# Patient Record
Sex: FEMALE | Race: WHITE | NOT HISPANIC OR LATINO | Employment: OTHER | URBAN - NONMETROPOLITAN AREA
[De-identification: names, ages, dates, MRNs, and addresses within clinical notes are randomized per-mention and may not be internally consistent; named-entity substitution may affect disease eponyms.]

---

## 2018-01-19 ENCOUNTER — OFFICE VISIT CONVERTED (OUTPATIENT)
Dept: FAMILY MEDICINE CLINIC | Age: 58
End: 2018-01-19
Attending: NURSE PRACTITIONER

## 2018-03-27 ENCOUNTER — OFFICE VISIT CONVERTED (OUTPATIENT)
Dept: FAMILY MEDICINE CLINIC | Age: 58
End: 2018-03-27
Attending: NURSE PRACTITIONER

## 2018-09-11 ENCOUNTER — OFFICE VISIT CONVERTED (OUTPATIENT)
Dept: FAMILY MEDICINE CLINIC | Age: 58
End: 2018-09-11
Attending: NURSE PRACTITIONER

## 2019-04-03 ENCOUNTER — OFFICE VISIT CONVERTED (OUTPATIENT)
Dept: FAMILY MEDICINE CLINIC | Age: 59
End: 2019-04-03
Attending: NURSE PRACTITIONER

## 2019-06-24 ENCOUNTER — HOSPITAL ENCOUNTER (OUTPATIENT)
Dept: OTHER | Facility: HOSPITAL | Age: 59
Discharge: HOME | End: 2019-06-24
Attending: INTERNAL MEDICINE

## 2019-06-24 LAB
ALBUMIN SERPL-MCNC: 4 G/DL (ref 3.5–5)
ALBUMIN/GLOB SERPL: 1.1 {RATIO} (ref 1.4–2.6)
ALP SERPL-CCNC: 132 U/L (ref 53–141)
ALT SERPL-CCNC: 12 U/L (ref 10–40)
ANION GAP SERPL CALC-SCNC: 16 MMOL/L (ref 8–19)
AST SERPL-CCNC: 15 U/L (ref 15–50)
BASOPHILS # BLD AUTO: 0.07 10*3/UL (ref 0–0.2)
BASOPHILS NFR BLD AUTO: 0.7 % (ref 0–3)
BILIRUB SERPL-MCNC: <0.15 MG/DL (ref 0.2–1.3)
BUN SERPL-MCNC: 11 MG/DL (ref 5–25)
BUN/CREAT SERPL: 14 {RATIO} (ref 6–20)
CALCIUM SERPL-MCNC: 9.3 MG/DL (ref 8.7–10.4)
CHLORIDE SERPL-SCNC: 97 MMOL/L (ref 99–111)
CONV ABS IMM GRAN: 0.01 10*3/UL (ref 0–0.2)
CONV CO2: 26 MMOL/L (ref 22–32)
CONV IMMATURE GRAN: 0.1 % (ref 0–1.8)
CONV TOTAL PROTEIN: 7.5 G/DL (ref 6.3–8.2)
CREAT UR-MCNC: 0.81 MG/DL (ref 0.5–0.9)
DEPRECATED RDW RBC AUTO: 51.6 FL (ref 36.4–46.3)
EOSINOPHIL # BLD AUTO: 0.16 10*3/UL (ref 0–0.7)
EOSINOPHIL # BLD AUTO: 1.6 % (ref 0–7)
ERYTHROCYTE [DISTWIDTH] IN BLOOD BY AUTOMATED COUNT: 15.9 % (ref 11.7–14.4)
GFR SERPLBLD BASED ON 1.73 SQ M-ARVRAT: >60 ML/MIN/{1.73_M2}
GLOBULIN UR ELPH-MCNC: 3.5 G/DL (ref 2–3.5)
GLUCOSE SERPL-MCNC: 87 MG/DL (ref 65–99)
HBA1C MFR BLD: 12.4 G/DL (ref 12–16)
HCT VFR BLD AUTO: 38.6 % (ref 37–47)
LYMPHOCYTES # BLD AUTO: 4.04 10*3/UL (ref 1–5)
MCH RBC QN AUTO: 28.5 PG (ref 27–31)
MCHC RBC AUTO-ENTMCNC: 32.1 G/DL (ref 33–37)
MCV RBC AUTO: 88.7 FL (ref 81–99)
MONOCYTES # BLD AUTO: 0.81 10*3/UL (ref 0.2–1.2)
MONOCYTES NFR BLD AUTO: 7.9 % (ref 3–10)
NEUTROPHILS # BLD AUTO: 5.13 10*3/UL (ref 2–8)
NEUTROPHILS NFR BLD AUTO: 50.2 % (ref 30–85)
NRBC CBCN: 0 % (ref 0–0.7)
OSMOLALITY SERPL CALC.SUM OF ELEC: 279 MOSM/KG (ref 273–304)
PLATELET # BLD AUTO: 361 10*3/UL (ref 130–400)
PMV BLD AUTO: 10.4 FL (ref 9.4–12.3)
POTASSIUM SERPL-SCNC: 3.7 MMOL/L (ref 3.5–5.3)
RBC # BLD AUTO: 4.35 10*6/UL (ref 4.2–5.4)
SODIUM SERPL-SCNC: 135 MMOL/L (ref 135–147)
T4 FREE SERPL-MCNC: 1.1 NG/DL (ref 0.9–1.8)
TSH SERPL-ACNC: 0.01 M[IU]/L (ref 0.27–4.2)
VARIANT LYMPHS NFR BLD MANUAL: 39.5 % (ref 20–45)
WBC # BLD AUTO: 10.22 10*3/UL (ref 4.8–10.8)

## 2019-07-25 ENCOUNTER — OFFICE VISIT CONVERTED (OUTPATIENT)
Dept: FAMILY MEDICINE CLINIC | Age: 59
End: 2019-07-25
Attending: NURSE PRACTITIONER

## 2019-11-05 ENCOUNTER — OFFICE VISIT CONVERTED (OUTPATIENT)
Dept: FAMILY MEDICINE CLINIC | Age: 59
End: 2019-11-05
Attending: NURSE PRACTITIONER

## 2019-11-05 ENCOUNTER — HOSPITAL ENCOUNTER (OUTPATIENT)
Dept: OTHER | Facility: HOSPITAL | Age: 59
Discharge: HOME | End: 2019-11-05
Attending: NURSE PRACTITIONER

## 2019-11-05 LAB
ALBUMIN SERPL-MCNC: 4.4 G/DL (ref 3.5–5)
ALBUMIN/GLOB SERPL: 1.3 {RATIO} (ref 1.4–2.6)
ALP SERPL-CCNC: 113 U/L (ref 53–141)
ALT SERPL-CCNC: 11 U/L (ref 10–40)
ANION GAP SERPL CALC-SCNC: 19 MMOL/L (ref 8–19)
AST SERPL-CCNC: 16 U/L (ref 15–50)
BILIRUB SERPL-MCNC: <0.15 MG/DL (ref 0.2–1.3)
BUN SERPL-MCNC: 12 MG/DL (ref 5–25)
BUN/CREAT SERPL: 15 {RATIO} (ref 6–20)
CALCIUM SERPL-MCNC: 10 MG/DL (ref 8.7–10.4)
CHLORIDE SERPL-SCNC: 97 MMOL/L (ref 99–111)
CONV CO2: 25 MMOL/L (ref 22–32)
CONV TOTAL PROTEIN: 7.8 G/DL (ref 6.3–8.2)
CREAT UR-MCNC: 0.8 MG/DL (ref 0.5–0.9)
GFR SERPLBLD BASED ON 1.73 SQ M-ARVRAT: >60 ML/MIN/{1.73_M2}
GLOBULIN UR ELPH-MCNC: 3.4 G/DL (ref 2–3.5)
GLUCOSE SERPL-MCNC: 80 MG/DL (ref 65–99)
OSMOLALITY SERPL CALC.SUM OF ELEC: 283 MOSM/KG (ref 273–304)
POTASSIUM SERPL-SCNC: 3.5 MMOL/L (ref 3.5–5.3)
SODIUM SERPL-SCNC: 137 MMOL/L (ref 135–147)

## 2019-12-27 ENCOUNTER — HOSPITAL ENCOUNTER (OUTPATIENT)
Dept: OTHER | Facility: HOSPITAL | Age: 59
Discharge: HOME | End: 2019-12-27
Attending: INTERNAL MEDICINE

## 2019-12-27 LAB
ALBUMIN SERPL-MCNC: 4.2 G/DL (ref 3.5–5)
ALBUMIN/GLOB SERPL: 1.2 {RATIO} (ref 1.4–2.6)
ALP SERPL-CCNC: 115 U/L (ref 53–141)
ALT SERPL-CCNC: 12 U/L (ref 10–40)
ANION GAP SERPL CALC-SCNC: 16 MMOL/L (ref 8–19)
AST SERPL-CCNC: 15 U/L (ref 15–50)
BASOPHILS # BLD MANUAL: 0.06 10*3/UL (ref 0–0.2)
BASOPHILS NFR BLD MANUAL: 0.6 % (ref 0–3)
BILIRUB SERPL-MCNC: <0.15 MG/DL (ref 0.2–1.3)
BUN SERPL-MCNC: 13 MG/DL (ref 5–25)
BUN/CREAT SERPL: 15 {RATIO} (ref 6–20)
CALCIUM SERPL-MCNC: 9.4 MG/DL (ref 8.7–10.4)
CHLORIDE SERPL-SCNC: 98 MMOL/L (ref 99–111)
CONV CO2: 26 MMOL/L (ref 22–32)
CONV TOTAL PROTEIN: 7.7 G/DL (ref 6.3–8.2)
CREAT UR-MCNC: 0.85 MG/DL (ref 0.5–0.9)
DEPRECATED RDW RBC AUTO: 51.1 FL
EOSINOPHIL # BLD MANUAL: 0.17 10*3/UL (ref 0–0.7)
EOSINOPHIL NFR BLD MANUAL: 1.6 % (ref 0–7)
ERYTHROCYTE [DISTWIDTH] IN BLOOD BY AUTOMATED COUNT: 15.5 % (ref 11.5–14.5)
GFR SERPLBLD BASED ON 1.73 SQ M-ARVRAT: >60 ML/MIN/{1.73_M2}
GLOBULIN UR ELPH-MCNC: 3.5 G/DL (ref 2–3.5)
GLUCOSE SERPL-MCNC: 101 MG/DL (ref 65–99)
GRANS (ABSOLUTE): 5.85 10*3/UL (ref 2–8)
GRANS: 55.7 % (ref 30–85)
HBA1C MFR BLD: 13.2 G/DL (ref 12–16)
HCT VFR BLD AUTO: 40.7 % (ref 37–47)
IMM GRANULOCYTES # BLD: 0.01 10*3/UL (ref 0–0.54)
IMM GRANULOCYTES NFR BLD: 0.1 % (ref 0–0.43)
LYMPHOCYTES # BLD MANUAL: 3.69 10*3/UL (ref 1–5)
LYMPHOCYTES NFR BLD MANUAL: 6.9 % (ref 3–10)
MCH RBC QN AUTO: 29.1 PG (ref 27–31)
MCHC RBC AUTO-ENTMCNC: 32.4 G/DL (ref 33–37)
MCV RBC AUTO: 89.8 FL (ref 81–99)
MONOCYTES # BLD AUTO: 0.73 10*3/UL (ref 0.2–1.2)
OSMOLALITY SERPL CALC.SUM OF ELEC: 282 MOSM/KG (ref 273–304)
PLATELET # BLD AUTO: 357 10*3/UL (ref 130–400)
PMV BLD AUTO: 9.9 FL (ref 7.4–10.4)
POTASSIUM SERPL-SCNC: 4 MMOL/L (ref 3.5–5.3)
RBC # BLD AUTO: 4.53 10*6/UL (ref 4.2–5.4)
SODIUM SERPL-SCNC: 136 MMOL/L (ref 135–147)
T4 FREE SERPL-MCNC: 1.1 NG/DL (ref 0.9–1.8)
TSH SERPL-ACNC: 0.51 M[IU]/L (ref 0.27–4.2)
VARIANT LYMPHS NFR BLD MANUAL: 35.1 % (ref 20–45)
WBC # BLD AUTO: 10.51 10*3/UL (ref 4.8–10.8)

## 2019-12-30 LAB — CONV THYROID STIMULATING IMMUNOGLOBULINS: 0.68 IU/L (ref 0–0.55)

## 2020-05-29 ENCOUNTER — OFFICE VISIT CONVERTED (OUTPATIENT)
Dept: FAMILY MEDICINE CLINIC | Age: 60
End: 2020-05-29
Attending: NURSE PRACTITIONER

## 2020-12-30 ENCOUNTER — OFFICE VISIT CONVERTED (OUTPATIENT)
Dept: FAMILY MEDICINE CLINIC | Age: 60
End: 2020-12-30
Attending: NURSE PRACTITIONER

## 2021-05-18 NOTE — PROGRESS NOTES
Sturgeon, Martha S. 1960     Office/Outpatient Visit    Visit Date: Tue, Mar 27, 2018 04:28 pm    Provider: Melina Pugh N.P. (Assistant: Doreen Nicholson MA)    Location: [facility]        Electronically signed by Melina Pugh N.P. on  03/27/2018 10:11:25 PM                             SUBJECTIVE:        CC:     Selina is a 58 year old White female.  med refills;         HPI:         Patient presents with anxiety.  doing well on paxil.  denies side effects.  requests refills.          Additionally, she presents with history of hypertension.  her current cardiac medication regimen includes a diuretic ( hctz ).  She did not bring her blood pressure diary, but says that pressures have been well controlled.  She is tolerating the medication well without side effects.  Compliance with treatment has been good.          dx 2 yrs ago.  saw dr mello endocrinology.  thyroid scan and US negative.  has been unable to follow up with endocrinology due to work schedule.  is aware that risks of untreated hyperthyroidism could include bone thinning, cardiac issues, and the current anxiety for which she is being treated.  issue with discrepancy of recent note may result in loss of 5 vacation days.      ROS:     CONSTITUTIONAL:  Negative for chills, fatigue, fever, and weight change.      CARDIOVASCULAR:  Negative for chest pain, palpitations, tachycardia, orthopnea, and edema.      RESPIRATORY:  Negative for cough, dyspnea, and hemoptysis.      GASTROINTESTINAL:  Negative for abdominal pain, heartburn, constipation, diarrhea, and stool changes.      MUSCULOSKELETAL:  Negative for arthralgias, back pain, and myalgias.      NEUROLOGICAL:  Negative for dizziness, headaches, paresthesias, and weakness.      ENDOCRINE:  Negative for hair loss, heat/cold intolerance, polydipsia, and polyphagia.      PSYCHIATRIC:  Positive for anxiety ( (stable) ).   Negative for mood swings, sleep disturbance or suicidal thoughts.           PMH/FMH/SH:     Last Reviewed on 3/27/2018 05:18 PM by Melina Pugh    Past Medical History:                 PAST MEDICAL HISTORY         Hypertension: dx'd in 2004;     Hyperthyroidism: dx'd in 2016;     Anxiety: dx'd in 2000;         GYNECOLOGICAL HISTORY:    Last mammogram was 2018- normal; No history of abnormal mammograms         CURRENT MEDICAL PROVIDERS:    endocrinologist dr mello         PREVENTIVE HEALTH MAINTENANCE             COLORECTAL CANCER SCREENING: declines colorectal cancer screening, understands reason for testing     MAMMOGRAM: was last done march 2018 with normal results         Surgical History:     Other Surgeries    Hysterectomy: 2004 complete;     right carpal tunnel surgery 2008;         Family History:     Father: aneurysm, stroke, hypertension     Mother: Hypertension;  Anxiety     paternal aunt diabetes         Social History:         Marital Status:      Children: 2 children         Tobacco/Alcohol/Supplements:     Last Reviewed on 3/27/2018 04:32 PM by Doreen Nicholson    Tobacco: Current Smoker: She currently smokes every day, 1/2 to 1 pack per day.              Current Problems:     Tachycardia, NOS     Vitamin D deficiency, unspecified     Anxiety     Hypertension         Immunizations:     None        Allergies:     Last Reviewed on 1/19/2018 11:24 AM by Anuja Galindo    Lamisil:        Current Medications:     Last Reviewed on 1/19/2018 11:24 AM by Anuja Galindo    Hydrochlorothiazide (HCTZ) 25mg Tablet 1 tab daily     Paroxetine HCl 20mg Tablet 1/2 po q day     Vitamin D2 2,000IU Tablet Take 1 tablet(s) by mouth daily         OBJECTIVE:        Vitals:         Historical:     01/19/2018  BP:   139/88 mm Hg ( (left arm, , sitting, );)     01/19/2018  Wt:   157lbs        Current: 3/27/2018 4:31:32 PM    Ht:  5 ft, 0.75 in;  Wt: 163.8 lbs;  BMI: 31.2    T: 97.5 F (oral);  BP: 138/68 mm Hg (left arm, sitting);  P: 105 bpm (finger clip, sitting);  sCr: 0.74  mg/dL;  GFR: 81.26        Exams:     PHYSICAL EXAM:     GENERAL:  well developed and nourished; appropriately groomed; in no apparent distress;     RESPIRATORY: normal respiratory rate and pattern with no distress; normal breath sounds with no rales, rhonchi, wheezes or rubs;     CARDIOVASCULAR: normal rate; rhythm is regular;     MUSCULOSKELETAL:  Normal range of motion, strength and tone;     NEUROLOGIC: mental status: alert and oriented x 3; GROSSLY INTACT     PSYCHIATRIC:  appropriate affect and demeanor; normal speech pattern; grossly normal memory;         ASSESSMENT           300.02   F41.9  Anxiety              DDx:     401.1   I10  Hypertension              DDx:     242.90   E05.90  Hyperthyroidism              DDx:         ORDERS:         Meds Prescribed:       Refill of: Paroxetine HCl 20mg Tablet 1/2 po q day  #45 (Forty Five) tablet(s) Refills: 1       Refill of: Hydrochlorothiazide (HCTZ) 25mg Tablet 1 tab daily  #90 (Ninety) tablet(s) Refills: 1         Lab Orders:       [id number]  [facility] Comp. Metabolic Panel  (Send-Out)                   PLAN:          Anxiety           Prescriptions:       Refill of: Paroxetine HCl 20mg Tablet 1/2 po q day  #45 (Forty Five) tablet(s) Refills: 1          Hypertension     LABORATORY:  Labs ordered to be performed today include Comprehensive metabolic panel.      RECOMMENDATIONS given include: avoid pseudoephedrine or other stimulants/decongestants in common cold remedies, decrease consumption of alcohol, perform routine monitoring of blood pressure with home blood pressure cuff, exercise, and reduction of dietary salt intake.            Prescriptions:       Refill of: Hydrochlorothiazide (HCTZ) 25mg Tablet 1 tab daily  #90 (Ninety) tablet(s) Refills: 1           Orders:       [id number]  [facility] Comp. Metabolic Panel  (Send-Out)            Hyperthyroidism         [facility] handout on hyperthyroidism, treatment and complications.  states she will follow up with  endocrinology.  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  tsh and lipid will be checked.  she will provide copy or we will have to recollect here.              Patient Recommendations:        For  Hypertension:     Certain decongestants and stimulants (like pseudoephedrine) in common cold remedies raise blood pressure, sometimes to dangerously high levels. Never take with MAO inhibitors! Avoid alcohol as it can contribute to elevated blood pressure. Begin monitoring your blood pressure by brief nurse visits at our office, a home blood pressure monitor, or by checking on the machines in pharmacies or stores.  Keep a log of the readings. Maintain a regular exercise program. Reduce the amount of salt in your diet.              CHARGE CAPTURE           **Please note: ICD descriptions below are intended for billing purposes only and may not represent clinical diagnoses**        Primary Diagnosis:         300.02 Anxiety            F41.9    Anxiety disorder, unspecified              Orders:          [id number]   Office/outpatient visit; established patient, level 4  (In-House)           401.1 Hypertension            I10    Essential (primary) hypertension    242.90 Hyperthyroidism            E05.90    Thyrotoxicosis, unspecified without thyrotoxic crisis or storm

## 2021-05-18 NOTE — PROGRESS NOTES
Sturgeon, Martha S. 1960     Office/Outpatient Visit    Visit Date: Thu, Jul 25, 2019 04:22 pm    Provider: Melina Pugh N.P. (Assistant: Sarah Spurling, MA)    Location: [facility]        Electronically signed by Melina Pugh N.P. on  07/26/2019 01:08:02 PM                             SUBJECTIVE:        CC:     Selina is a 59 year old White female.  Pt is here for foot pain, right foot pain, in her heel. She said it feels like she stepped on a rock.;         HPI:         Selina presents with foot pain.  Today's visit is for evaluation of the right foot.  The location of the discomfort is primarily the heel.  The pain does not radiate.  The pain initially began 1 to 2 months ago.  No precipitating event or injury is identified.      ROS:     CONSTITUTIONAL:  Negative for chills, fatigue, fever, and weight change.      CARDIOVASCULAR:  Negative for chest pain, palpitations, tachycardia, orthopnea, and edema.      RESPIRATORY:  Negative for cough, dyspnea, and hemoptysis.      MUSCULOSKELETAL:  Positive for right foot pain.      NEUROLOGICAL:  Negative for dizziness, headaches, paresthesias, and weakness.      ENDOCRINE:  Negative for hair loss, heat/cold intolerance, polydipsia, and polyphagia.      PSYCHIATRIC:  Negative for anxiety, depression, and sleep disturbances.          PMH/FMH/SH:     Last Reviewed on 7/26/2019 01:06 PM by Melina Pugh    Past Medical History:                 PAST MEDICAL HISTORY         Hypertension: dx'd in 2004;     Hyperthyroidism: dx'd in 2016;     Anxiety: dx'd in 2000;         GYNECOLOGICAL HISTORY:    Last mammogram was 2018- normal; No history of abnormal mammograms         CURRENT MEDICAL PROVIDERS:    endocrinologist dr mello         PREVENTIVE HEALTH MAINTENANCE             COLORECTAL CANCER SCREENING: declines colorectal cancer screening, understands reason for testing     MAMMOGRAM: was last done march 2018 with normal results         Surgical  History:     Other Surgeries    Hysterectomy: 2004 complete;     right carpal tunnel surgery ;         Family History:     Father: aneurysm, stroke, hypertension     Mother: Congestive Heart Failure ( and dementia  age 79 ); Hypertension;  Anxiety     paternal aunt diabetes         Social History:         Marital Status:      Children: 2 children         Tobacco/Alcohol/Supplements:     Last Reviewed on 2019 04:33 PM by Spurling, Sarah C    Tobacco: Current Smoker: She currently smokes every day, 1/2 to 1 pack per day.              Current Problems:     Tachycardia, NOS     Vitamin D deficiency, unspecified     Anxiety     Hypertension         Immunizations:     None        Allergies:     Last Reviewed on 2019 04:39 PM by Dionne Case    Lamisil:        Current Medications:     Last Reviewed on 2019 04:41 PM by Dionne Case    Hydrochlorothiazide (HCTZ) 25mg Tablet 1 tab daily     Paroxetine HCl 20mg Tablet 1/2 po q day     Methimazole 5mg Tablet Take 1 tablet(s) by mouth bid     Vitamin D2 2,000IU Tablet Take 1 tablet(s) by mouth daily         OBJECTIVE:        Vitals:         Historical:     2019  BP:   137/73 mm Hg ( (right arm, , sitting, );)     2019  Wt:   164.8lbs        Current: 2019 4:42:10 PM    Ht:  5 ft, 0.75 in;  Wt: 173.8 lbs;  BMI: 33.1    T: 98.6 F (oral);  BP: 134/79 mm Hg (left arm, sitting);  P: 96 bpm (left arm (BP Cuff), sitting);  sCr: 0.96 mg/dL;  GFR: 63.46        Exams:     PHYSICAL EXAM:     GENERAL: no apparent distress;     RESPIRATORY: normal respiratory rate and pattern with no distress; normal breath sounds with no rales, rhonchi, wheezes or rubs;     CARDIOVASCULAR: normal rate; rhythm is regular;     Peripheral Pulses: dorsalis pedis: 2+ amplitude, no bruits; posterior tibial: 2+ amplitude, no bruits; no edema;     MUSCULOSKELETAL:  Normal range of motion, strength and tone;     NEUROLOGIC: mental status: alert and oriented  x 3; GROSSLY INTACT     PSYCHIATRIC:  appropriate affect and demeanor; normal speech pattern; grossly normal memory;         ASSESSMENT           729.5   M79.671  Foot pain              DDx:         ORDERS:         Meds Prescribed:       Meloxicam 7.5mg Tablet 1 bid prn pain.  take with food.  do not take with tylenol, ibuprofen, or other NSAIDs.  #30 (Thirty) tablet(s) Refills: 0         Radiology/Test Orders:       [id number]  Right radiologic examination, foot; complete, minimum of three views  (Send-Out)                   PLAN:          Foot pain         RADIOLOGY:  I have ordered a right foot x-ray to be done today.      consider referral to podiatrist.  shoe inserts, supportive shoes.  apply cold.            Prescriptions:       Meloxicam 7.5mg Tablet 1 bid prn pain.  take with food.  do not take with tylenol, ibuprofen, or other NSAIDs.  #30 (Thirty) tablet(s) Refills: 0           Orders:       [id number]  Right radiologic examination, foot; complete, minimum of three views  (Send-Out)             Patient Education Handouts:       Plantar Fasciitis        Heel Spur              CHARGE CAPTURE           **Please note: ICD descriptions below are intended for billing purposes only and may not represent clinical diagnoses**        Primary Diagnosis:         729.5 Foot pain            M79.671    Pain in right foot              Orders:          [id number]   Office/outpatient visit; established patient, level 3  (In-House)

## 2021-05-18 NOTE — PROGRESS NOTES
Sturgeon, Martha S. 1960     Office/Outpatient Visit    Visit Date: Wed, Apr 3, 2019 04:37 pm    Provider: Melina Pugh N.P. (Assistant: Dionne Case MA)    Location: [facility]        Electronically signed by Melina Pugh N.P. on  04/05/2019 08:05:57 AM                             SUBJECTIVE:        CC:     Selina is a 59 year old White female.  This is a follow-up visit.  She presents with medication refill.          HPI:         Selina presents with anxiety.  In regard to the anxiety, doing well on paroxetine.  denies side effects.  requests refills.          Hypertension details; concerning hypertension, her current cardiac medication regimen includes a diuretic ( hctz ).  She did not bring her blood pressure diary, but says that pressures have been well controlled.      ROS:     CONSTITUTIONAL:  Negative for chills, fatigue, fever, and weight change.      CARDIOVASCULAR:  Negative for chest pain, palpitations, tachycardia, orthopnea, and edema.      RESPIRATORY:  Negative for cough, dyspnea, and hemoptysis.      MUSCULOSKELETAL:  Negative for arthralgias, back pain, and myalgias.      NEUROLOGICAL:  Negative for dizziness, headaches, paresthesias, and weakness.      ENDOCRINE:  Negative for hair loss, heat/cold intolerance, polydipsia, and polyphagia.      PSYCHIATRIC:  Positive for anxiety ( (stable) ).   Negative for sleep disturbance or suicidal thoughts.          PMH/FMH/SH:     Last Reviewed on 3/27/2018 05:18 PM by Melina Pugh    Past Medical History:                 PAST MEDICAL HISTORY         Hypertension: dx'd in 2004;     Hyperthyroidism: dx'd in 2016;     Anxiety: dx'd in 2000;         GYNECOLOGICAL HISTORY:    Last mammogram was 2018- normal; No history of abnormal mammograms         CURRENT MEDICAL PROVIDERS:    endocrinologist dr mello         PREVENTIVE HEALTH MAINTENANCE             COLORECTAL CANCER SCREENING: declines colorectal cancer screening, understands  reason for testing     MAMMOGRAM: was last done 2018 with normal results         Surgical History:     Other Surgeries    Hysterectomy: 2004 complete;     right carpal tunnel surgery ;         Family History:     Father: aneurysm, stroke, hypertension     Mother: Congestive Heart Failure ( and dementia  age 79 ); Hypertension;  Anxiety     paternal aunt diabetes         Social History:         Marital Status:      Children: 2 children         Tobacco/Alcohol/Supplements:     Last Reviewed on 2018 04:25 PM by Spurling, Sarah C    Tobacco: Current Smoker: She currently smokes every day, 1/2 to 1 pack per day.              Current Problems:     Tachycardia, NOS     Vitamin D deficiency, unspecified     Anxiety     Hypertension         Immunizations:     None        Allergies:     Last Reviewed on 2018 04:23 PM by Spurling, Sarah C    Lamisil:        Current Medications:     Last Reviewed on 2019 04:41 PM by Dionne Case    Hydrochlorothiazide (HCTZ) 25mg Tablet 1 tab daily     Paroxetine HCl 20mg Tablet 1/2 po q day     Vitamin D2 2,000IU Tablet Take 1 tablet(s) by mouth daily     Methimazole 5mg Tablet Take 1 tablet(s) by mouth bid         OBJECTIVE:        Vitals:         Historical:     2018  BP:   131/88 mm Hg ( (right arm, , sitting, );)     2018  Wt:   158.8lbs        Current: 4/3/2019 4:43:18 PM    Ht:  5 ft, 0.75 in;  Wt: 164.8 lbs;  BMI: 31.4    T: 98.2 F (oral);  BP: 137/73 mm Hg (right arm, sitting);  P: 96 bpm (right arm (BP Cuff), sitting);  sCr: 0.96 mg/dL;  GFR: 62.04        Exams:     PHYSICAL EXAM:     GENERAL:  well developed and nourished; appropriately groomed; in no apparent distress;     RESPIRATORY: normal respiratory rate and pattern with no distress; normal breath sounds with no rales, rhonchi, wheezes or rubs;     CARDIOVASCULAR: normal rate; rhythm is regular;  no edema;     MUSCULOSKELETAL:  Normal range of motion, strength and tone;      NEUROLOGIC: mental status: alert and oriented x 3; GROSSLY INTACT     PSYCHIATRIC:  appropriate affect and demeanor; normal speech pattern; grossly normal memory;         ASSESSMENT           300.02   F41.9  Anxiety              DDx:     401.1   I10  Hypertension              DDx:         ORDERS:         Meds Prescribed:       Refill of: Paroxetine HCl 20mg Tablet 1/2 po q day  #45 (Forty Five) tablet(s) Refills: 1       Refill of: Hydrochlorothiazide (HCTZ) 25mg Tablet 1 tab daily  #90 (Ninety) tablet(s) Refills: 1                 PLAN:          Anxiety         RECOMMENDATIONS given include: avoidance of caffeine, avoid stressful situations, Encourage proper sleep habits, regulate breathing, and stress reduction.            Prescriptions:       Refill of: Paroxetine HCl 20mg Tablet 1/2 po q day  #45 (Forty Five) tablet(s) Refills: 1          Hypertension         RECOMMENDATIONS given include: avoid pseudoephedrine or other stimulants/decongestants in common cold remedies, perform routine monitoring of blood pressure with home blood pressure cuff, exercise, reduction of dietary salt intake, and stress reduction.      brought recent employee health screening results.  had labs collected per endocrinology today.  requests no labs today.            Prescriptions:       Refill of: Hydrochlorothiazide (HCTZ) 25mg Tablet 1 tab daily  #90 (Ninety) tablet(s) Refills: 1             Patient Recommendations:        For  Anxiety:     Try to avoid or reduce the amount of caffeine intake. Try stress reduction methods to reduce the frequency or lessen the severity of anxiety episodes.          For  Hypertension:     Certain decongestants and stimulants (like pseudoephedrine) in common cold remedies raise blood pressure, sometimes to dangerously high levels. Never take with MAO inhibitors! Begin monitoring your blood pressure by brief nurse visits at our office, a home blood pressure monitor, or by checking on the machines in  pharmacies or stores.  Keep a log of the readings. Maintain a regular exercise program. Reduce the amount of salt in your diet. Try and reduce the effects of stress on blood pressure by relaxation, meditation, biofeedback, exercise or other stress reduction methods.              CHARGE CAPTURE           **Please note: ICD descriptions below are intended for billing purposes only and may not represent clinical diagnoses**        Primary Diagnosis:         300.02 Anxiety            F41.9    Anxiety disorder, unspecified              Orders:          [id number]   Office/outpatient visit; established patient, level 3  (In-House)           401.1 Hypertension            I10    Essential (primary) hypertension

## 2021-05-18 NOTE — PROGRESS NOTES
Sturgeon, Martha S. 1960     Office/Outpatient Visit    Visit Date: Tue, Nov 5, 2019 04:18 pm    Provider: Melina Pugh N.P. (Assistant: Marti Merida MA)    Location: [facility]        Electronically signed by Melina Pugh N.P. on  11/05/2019 09:35:17 PM                             SUBJECTIVE:        CC:     Selina is a 59 year old White female.  This is a follow-up visit.  med refill; PT STATES SHE ISNT TAKING METHIMAZOLE         HPI:         Patient to be evaluated for anxiety.  Selina presents with anxiety.  In regard to the anxiety, doing well on paroxetine.  denies side effects.  requests refills.          Concerning hypertension, hypertension details; concerning hypertension, her current cardiac medication regimen includes a diuretic ( hctz ).  She did not bring her blood pressure diary, but says that pressures have been well controlled.      ROS:     CONSTITUTIONAL:  Negative for chills, fatigue, fever, and weight change.      CARDIOVASCULAR:  Negative for chest pain, palpitations, tachycardia, orthopnea, and edema.      RESPIRATORY:  Negative for cough, dyspnea, and hemoptysis.      MUSCULOSKELETAL:  Negative for arthralgias, back pain, and myalgias.      NEUROLOGICAL:  Negative for dizziness, headaches, paresthesias, and weakness.      ENDOCRINE:  Negative for hair loss, heat/cold intolerance, polydipsia, and polyphagia.      PSYCHIATRIC:  Positive for anxiety ( (stable) ).   Negative for sleep disturbance or suicidal thoughts.          PMH/FMH/SH:     Last Reviewed on 7/26/2019 01:06 PM by Melina Pugh    Past Medical History:                 PAST MEDICAL HISTORY         Hypertension: dx'd in 2004;     Hyperthyroidism: dx'd in 2016;     Anxiety: dx'd in 2000;         GYNECOLOGICAL HISTORY:    Last mammogram was 2018- normal; No history of abnormal mammograms         CURRENT MEDICAL PROVIDERS:    endocrinologist dr mello         [facility]              COLORECTAL CANCER SCREENING: declines colorectal cancer screening, understands reason for testing     MAMMOGRAM: was last done 2018 with normal results         Surgical History:     Other Surgeries    Hysterectomy: 2004 complete;     right carpal tunnel surgery ;         Family History:     Father: aneurysm, stroke, hypertension     Mother: Congestive Heart Failure ( and dementia  age 79 ); Hypertension;  Anxiety     paternal aunt diabetes         Social History:         Marital Status:      Children: 2 children         Tobacco/Alcohol/Supplements:     Last Reviewed on 2019 04:33 PM by Spurling, Sarah C    Tobacco: Current Smoker: She currently smokes every day, 1/2 to 1 pack per day.              Current Problems:     Tachycardia, NOS     Vitamin D deficiency, unspecified     Anxiety     Hypertension         Immunizations:     None        Allergies:     Last Reviewed on 2019 04:25 PM by Spurling, Sarah C    Lamisil:        Current Medications:     Last Reviewed on 2019 04:18 PM by Marti Merida    Hydrochlorothiazide (HCTZ) 25mg Tablet 1 tab daily     Paroxetine HCl 20mg Tablet 1/2 po q day     Methimazole 5mg Tablet Take 1 tablet(s) by mouth bid     Vitamin D2 2,000IU Tablet Take 1 tablet(s) by mouth daily         OBJECTIVE:        Vitals:         Historical:     2019  BP:   134/79 mm Hg ( (left arm, , sitting, );)     2019  Wt:   173.8lbs        Current: 2019 4:32:03 PM    Ht:  5 ft, 0.75 in;  Wt: 180.6 lbs;  BMI: 34.4    T: 98.1 F (oral);  BP: 162/70 mm Hg (left arm, sitting);  P: 83 bpm (left arm (BP Cuff), sitting);  sCr: 0.96 mg/dL;  GFR: 64.51        Repeat:     4:40:06 PM     BP:   146/77mm Hg (left arm, sitting, HEART RATE: 80)         Exams:     PHYSICAL EXAM:     GENERAL: no apparent distress;     RESPIRATORY: normal respiratory rate and pattern with no distress; normal breath sounds with no rales, rhonchi, wheezes or rubs;     CARDIOVASCULAR:  normal rate; rhythm is regular;     Peripheral Pulses: posterior tibial: 2+ amplitude, no bruits; no edema;     MUSCULOSKELETAL:  Normal range of motion, strength and tone;     NEUROLOGIC: mental status: alert and oriented x 3; GROSSLY INTACT     PSYCHIATRIC:  appropriate affect and demeanor; normal speech pattern; grossly normal memory;         ASSESSMENT           300.02   F41.9  Anxiety              DDx:     401.1   I10  Hypertension              DDx:         ORDERS:         Meds Prescribed:       Refill of: Hydrochlorothiazide (HCTZ) 25mg Tablet 1 tab daily  #90 (Ninety) tablet(s) Refills: 1       Refill of: Paroxetine HCl 20mg Tablet 1/2 po q day  #45 (Forty Five) tablet(s) Refills: 1         Lab Orders:       [id number]  [facility] Comp. Metabolic Panel  (Send-Out)                   PLAN:          Anxiety     MIPS Vaccines Flu and Pneumonia updated in Shot record           Prescriptions:       Refill of: Paroxetine HCl 20mg Tablet 1/2 po q day  #45 (Forty Five) tablet(s) Refills: 1          Hypertension     LABORATORY:  Labs ordered to be performed today include Comprehensive metabolic panel.      RECOMMENDATIONS given include: avoid pseudoephedrine or other stimulants/decongestants in common cold remedies, perform routine monitoring of blood pressure with home blood pressure cuff, exercise, reduction of dietary salt intake, and take medication as prescribed, try not to miss doses.            Prescriptions:       Refill of: Hydrochlorothiazide (HCTZ) 25mg Tablet 1 tab daily  #90 (Ninety) tablet(s) Refills: 1           Orders:       [id number]  [facility] Comp. Metabolic Panel  (Send-Out)               Patient Recommendations:        For  Hypertension:     Certain decongestants and stimulants (like pseudoephedrine) in common cold remedies raise blood pressure, sometimes to dangerously high levels. Never take with MAO inhibitors! Begin monitoring your blood pressure by brief nurse visits at our office, a home blood  pressure monitor, or by checking on the machines in pharmacies or stores.  Keep a log of the readings. Maintain a regular exercise program. Reduce the amount of salt in your diet.              CHARGE CAPTURE           **Please note: ICD descriptions below are intended for billing purposes only and may not represent clinical diagnoses**        Primary Diagnosis:         300.02 Anxiety            F41.9    Anxiety disorder, unspecified              Orders:          [id number]   Office/outpatient visit; established patient, level 3  (In-House)           401.1 Hypertension            I10    Essential (primary) hypertension

## 2021-05-18 NOTE — PROGRESS NOTES
Sturgeon, Martha S  1960     Office/Outpatient Visit    Visit Date: Wed, Dec 30, 2020 04:22 pm    Provider: Melina Pugh N.P. (Assistant: Kp Reid MA)    Location: [facility]        Electronically signed by Melina Pugh N.P. on  12/30/2020 05:00:56 PM                             Subjective:        CC: Selina is a 60 year old White female.  F/U;         HPI:           Selina presents with anxiety disorder, unspecified.  stable on paxil.  denies side effects.  requests refills.            Dx with essential (primary) hypertension; her current cardiac medication regimen includes a diuretic ( hctz ).  Compliance with treatment has been good; she takes her medication as directed.  She is tolerating the medication well without side effects.  She did not bring her blood pressure diary, but says that pressures have been well controlled.      ROS:     CONSTITUTIONAL:  Positive for intentional wt loss.   Negative for chills, fatigue or fever.      CARDIOVASCULAR:  Negative for chest pain, palpitations, tachycardia, orthopnea, and edema.      RESPIRATORY:  Negative for cough, dyspnea, and hemoptysis.      GASTROINTESTINAL:  Negative for abdominal pain, heartburn, constipation, diarrhea, and stool changes.      MUSCULOSKELETAL:  Negative for arthralgias, back pain, and myalgias.      NEUROLOGICAL:  Negative for dizziness, headaches, paresthesias, and weakness.      ENDOCRINE:  Negative for hair loss, heat/cold intolerance, polydipsia, and polyphagia.      PSYCHIATRIC:  Positive for anxiety ( (stable) ).          Past Medical History / Family History / Social History:         Last Reviewed on 12/30/2020 04:40 PM by Melina Pugh    Past Medical History:                 PAST MEDICAL HISTORY         Hypertension: dx'd in 2004;     Hyperthyroidism: dx'd in 2016;     Anxiety: dx'd in 2000;         GYNECOLOGICAL HISTORY:    Last mammogram was 2018- normal; No history of abnormal mammograms          CURRENT MEDICAL PROVIDERS:    endocrinologist dr mello         [facility]             COLORECTAL CANCER SCREENING: declines colorectal cancer screening, understands reason for testing     MAMMOGRAM: was last done 2018 with normal results         Surgical History:     Other Surgeries    Hysterectomy: 2004 complete;     right carpal tunnel surgery ;         Family History:     Father: aneurysm, stroke, hypertension     Mother: Congestive Heart Failure ( and dementia  age 79 ); Hypertension;  Anxiety     paternal aunt diabetes         Social History:         Marital Status:      Children: 2 children         Tobacco/Alcohol/Supplements:     Last Reviewed on 2020 04:26 PM by Kp Reid    Tobacco: Current Smoker: She currently smokes every day, 1/2 to 1 pack per day.          Current Problems:     Last Reviewed on 2020 04:23 PM by Melina Pugh    Anxiety disorder, unspecified    Essential (primary) hypertension    Vitamin D deficiency, unspecified    Tachycardia, unspecified        Immunizations:     None        Allergies:     Last Reviewed on 2020 04:25 PM by Kp Reid    Lamisil:          Current Medications:     Last Reviewed on 2020 04:25 PM by Kp Reid    hydroCHLOROthiazide 25 mg oral tablet [Take 1 tablet by mouth once daily]    PARoxetine HCl 20 mg oral tablet [Take 1/2 (one-half) tablet by mouth once daily]    ergocalciferol (vitamin D2) 50 mcg (2,000 unit) oral tablet [Take 1 tablet(s) by mouth daily]        Objective:        Vitals:         Historical:     2019  BP:   146/77 mm Hg ( (left arm, , sitting, );) 2019  BP:   134/79 mm Hg ( (left arm, , sitting, );) 2019  Wt:   180.6lbs    Current: 2020 4:28:40 PM    Ht:  5 ft, 0.75 in;  Wt: 158.6 lbs;  BMI: 30.2T: 97.9 F (temporal);  BP: 140/59 mm Hg (left arm, sitting);  P: 86 bpm (left arm (BP Cuff), sitting);  sCr: 0.8 mg/dL;  GFR: 72.36         Repeat:     4:57:31 PM  BP:   142/74mm Hg (left arm, sitting)     Exams:     PHYSICAL EXAM:     GENERAL:  well developed and nourished; appropriately groomed; in no apparent distress;     RESPIRATORY: normal respiratory rate and pattern with no distress; normal breath sounds with no rales, rhonchi, wheezes or rubs;     CARDIOVASCULAR: normal rate; rhythm is regular;     MUSCULOSKELETAL:  Normal range of motion, strength and tone;     NEUROLOGIC: mental status: alert and oriented x 3; GROSSLY INTACT     PSYCHIATRIC:  appropriate affect and demeanor; normal speech pattern; grossly normal memory;         Assessment:         F41.9   Anxiety disorder, unspecified       I10   Essential (primary) hypertension       E05.90   Thyrotoxicosis, unspecified without thyrotoxic crisis or storm           ORDERS:         Meds Prescribed:       [Refilled] hydroCHLOROthiazide 25 mg oral tablet [Take 1 tablet by mouth once daily], #90 (ninety) tablets, Refills: 1 (one)       [Refilled] PARoxetine HCl 20 mg oral tablet [Take 1/2 (one-half) tablet by mouth once daily], #45 (forty five) tablets, Refills: 1 (one)         Lab Orders:       [id number]  HTNLP - [facility] CMP AND LIPID: [id number]  (Send-Out)            [id number]  TSH - [facility] TSH  (Send-Out)                      Plan:         Anxiety disorder, unspecified    MIPS Vaccines Flu and Pneumonia updated in Shot record           Prescriptions:       [Refilled] PARoxetine HCl 20 mg oral tablet [Take 1/2 (one-half) tablet by mouth once daily], #45 (forty five) tablets, Refills: 1 (one)         Essential (primary) hypertension    LABORATORY:  Labs ordered to be performed today include HTN/Lipid Panel: CMP, Lipid.      RECOMMENDATIONS given include: perform routine monitoring of blood pressure with home blood pressure cuff, reduction of dietary salt intake, take medication as prescribed, try not to miss doses, and keep bp log.  follow up if bp not 130s over 80s or lower.  states bp controlled at home  usually 120s over 80s..            Prescriptions:       [Refilled] hydroCHLOROthiazide 25 mg oral tablet [Take 1 tablet by mouth once daily], #90 (ninety) tablets, Refills: 1 (one)           Orders:       [id number]  HTNLP - [facility] CMP AND LIPID: [id number], [id number]  (Send-Out)              Thyrotoxicosis, unspecified without thyrotoxic crisis or storm    LABORATORY:  Labs ordered to be performed today include TSH.      does not follow up with endocrinology or want to take medication or treatment.  is agreeable to having levels monitored.            Orders:       [id number]  TSH - [facility] TSH  (Send-Out)                  Patient Recommendations:        For  Essential (primary) hypertension:    Begin monitoring your blood pressure by brief nurse visits at our office, a home blood pressure monitor, or by checking on the machines in pharmacies or stores.  Keep a log of the readings. Reduce the amount of salt in your diet.              Charge Capture:         Primary Diagnosis:     F41.9  Anxiety disorder, unspecified           Orders:      [id number]  Office/outpatient visit; established patient, level 3  (In-House)              I10  Essential (primary) hypertension     E05.90  Thyrotoxicosis, unspecified without thyrotoxic crisis or storm

## 2021-05-18 NOTE — PROGRESS NOTES
Sturgeon, Martha S. 1960     Office/Outpatient Visit    Visit Date: Fri, Jan 19, 2018 11:21 am    Provider: Melina Pugh N.P. (Assistant: Anuja Galindo RN)    Location: [facility]        Electronically signed by Melina Pugh N.P. on  01/19/2018 12:34:30 PM                             SUBJECTIVE:        CC:     Selina is a 57 year old White female.  Discuss short term diability;         HPI:         Selina presents with diarrhea.  This has been present for the last 5 days.  Associated symptoms include nausea and vomiting ( 2 episodes total ).  Selina denies recent travel outside of the country.  There has been no recent ingestion of antibiotics, shellfish, or undercooked hamburger.  She reports recent exposure to illness from family members.  very frequent monday.  was sent home from work.  has persisted each day with watery stool becoming loose and decreasing frequency.  no blood or mucus in bm.  loose stool x 4 in last 24 hrs.  now  has started with same symptoms.  afebrile. has perfect attendance at work.  feels that she will be able to return to work monday (works monday thru friday).  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      see HPI above.     ROS:     CONSTITUTIONAL:  Positive for fatigue.   Negative for fever.      E/N/T:  Negative for ear pain, nasal congestion, frequent rhinorrhea and sore throat.      CARDIOVASCULAR:  Negative for chest pain, palpitations, tachycardia, orthopnea, and edema.      RESPIRATORY:  Negative for cough, dyspnea, and hemoptysis.      GASTROINTESTINAL:  Positive for diarrhea, nausea and vomiting.   Negative for abdominal pain, dysphagia or hematochezia.      GENITOURINARY:  Negative for dysuria.      MUSCULOSKELETAL:  Negative for arthralgias, back pain, and myalgias.      NEUROLOGICAL:  Negative for dizziness, headaches, paresthesias, and weakness.      PSYCHIATRIC:  Negative for  anxiety, depression, and sleep disturbances.          PMH/FMH/SH:     Last Reviewed on 10/03/2017 05:32 PM by Melina Pugh    Past Medical History:                 PAST MEDICAL HISTORY         Hypertension: dx'd in 2004;     Hyperthyroidism: dx'd in 2016;     Anxiety: dx'd in 2000;         GYNECOLOGICAL HISTORY:    Last mammogram was march 2016; No history of abnormal mammograms         CURRENT MEDICAL PROVIDERS:    endocrinologist dr mello         Surgical History:     Other Surgeries    Hysterectomy: 2004 complete;     right carpal tunnel surgery 2008;         Family History:     Father: aneurysm, stroke, hypertension     Mother: Hypertension;  Anxiety     paternal aunt diabetes         Social History:         Marital Status:      Children: 2 children         Tobacco/Alcohol/Supplements:     Last Reviewed on 10/03/2017 04:17 PM by Vero Abebe    Tobacco: Current Smoker: She currently smokes every day, 1/2 to 1 pack per day.              Current Problems:     Vitamin D deficiency, unspecified     Anxiety     Hypertension         Immunizations:     None        Allergies:     Last Reviewed on 1/19/2018 11:24 AM by Anuja Galindo    Lamisil:        Current Medications:     Last Reviewed on 1/19/2018 11:24 AM by Anuja Galindo    Hydrochlorothiazide (HCTZ) 25mg Tablet 1 tab daily     Paroxetine HCl 20mg Tablet 1/2 po q day     Vitamin D2 2,000IU Tablet Take 1 tablet(s) by mouth daily         OBJECTIVE:        Vitals:         Historical:     10/03/2017  BP:   133/85 mm Hg ( (left arm, , sitting, );)     10/03/2017  P:   107bpm ( (left arm (BP Cuff), , sitting, );)     10/03/2017  Wt:   165.8lbs        Current: 1/19/2018 11:23:54 AM    Ht:  5 ft, 0.75 in;  Wt: 157 lbs;  BMI: 29.9    T: 97.6 F (oral);  BP: 139/88 mm Hg (left arm, sitting);  P: 124 bpm (left arm (BP Cuff), sitting);  sCr: 0.74 mg/dL;  GFR: 80.76        Repeat:     11:29:10 AM     P:   113bpm (finger clip, sitting)         Exams:      PHYSICAL EXAM:     GENERAL: no apparent distress;     E/N/T: EARS: bilateral TMs are normal;  NOSE: normal nasal mucosa; OROPHARYNX: posterior pharynx, including tonsils, tongue, and uvula are normal;     RESPIRATORY: normal respiratory rate and pattern with no distress; normal breath sounds with no rales, rhonchi, wheezes or rubs;     CARDIOVASCULAR: normal rate; rhythm is regular;     GASTROINTESTINAL: nontender; normal bowel sounds;     LYMPHATIC: no enlargement of cervical or facial nodes;     MUSCULOSKELETAL:  Normal range of motion, strength and tone;     NEUROLOGIC: mental status: alert and oriented x 3; GROSSLY INTACT     PSYCHIATRIC:  appropriate affect and demeanor; normal speech pattern; grossly normal memory;         ASSESSMENT           787.91   R19.7  Diarrhea              DDx:     787.02   R11.0  Nausea              DDx:     785.0   R00.0  Tachycardia, NOS              DDx:         ORDERS:         Meds Prescribed:       Promethazine HCl 25mg Tablet 1 tab q 6 - 8 hours prn nausea  #14 (Fourteen) tablet(s) Refills: 0                 PLAN: rtw january 22   cover january 16 thru 19.  short term disability.          Diarrhea         RECOMMENDATIONS given include: states she is improving.  if does not resolve in the next couple of days advise that she have labs done-  cbc, cmp, stool for culture and c diff.  she verbalizes understanding. lj.            Patient Education Handouts:       Gastroenteritis           Nausea         RECOMMENDATIONS given include: get plenty of rest, maintain a clear liquid diet, resume intake of solid foods gradually, promethazine may cause drowsiness., and Go to the ER if worse.            Prescriptions:       Promethazine HCl 25mg Tablet 1 tab q 6 - 8 hours prn nausea  #14 (Fourteen) tablet(s) Refills: 0          Tachycardia, NOS         RECOMMENDATIONS given include: remind her that beta blocker may help minimize tachycardia.  she does not want to take beta blocker due to  potential wt gain.  untreated hyerthyroidism can also cause tachycardia.  she has not followed up with endocrinology but states she will do so..              Patient Recommendations:        For  Nausea:     Get plenty of rest.     Maintain a diet consisting of clear liquids (clear beverages, broth, gelatin, flavored ices, pediatric electrolyte solutions, sports drinks, etc.).  Avoid solid foods or formula. After 4-8 hours have passed without vomiting, you may gradually advance your diet to include bland foods, such as saltine crackers or bread, or dilute formula.              CHARGE CAPTURE           **Please note: ICD descriptions below are intended for billing purposes only and may not represent clinical diagnoses**        Primary Diagnosis:         787.91 Diarrhea            R19.7    Diarrhea, unspecified              Orders:          [id number]   Office/outpatient visit; established patient, level 3  (In-House)           787.02 Nausea            R11.0    Nausea    785.0 Tachycardia, NOS            R00.0    Tachycardia, unspecified

## 2021-05-18 NOTE — PROGRESS NOTES
Sturgeon, Martha S  1960     Office/Outpatient Visit    Visit Date: Fri, May 29, 2020 04:16 pm    Provider: Melina Pugh N.P. (Assistant: Karon Samuels MA)    Location: [facility]        Electronically signed by Melina Pugh N.P. on  05/31/2020 10:45:46 PM                             Subjective:        CC: [phone] - dox audio Selina is a 60 year old White female.  This is a follow-up visit.  chech up;         HPI: telephone visit due to covid 19          Selina presents with anxiety disorder, unspecified.  doing well on paroxetine.  denies side effects.  requests refills.  states she feels very good and has lost 20 lbs intentionally.  states dr mello advised that she stop methimazole for hyperthyroidism.  did not get biometric screening this spring due to covid 19.            In regard to the essential (primary) hypertension, her current cardiac medication regimen includes a diuretic ( hctz ).  Compliance with treatment has been good; she takes her medication as directed.  She is tolerating the medication well without side effects.  She did not bring her blood pressure diary, but says that pressures have been well controlled.      ROS:     CONSTITUTIONAL:  Negative for chills, fatigue, fever, and weight change.      CARDIOVASCULAR:  Negative for chest pain, palpitations, tachycardia, orthopnea, and edema.      RESPIRATORY:  Negative for cough, dyspnea, and hemoptysis.      MUSCULOSKELETAL:  Negative for arthralgias, back pain, and myalgias.      NEUROLOGICAL:  Negative for dizziness, headaches, paresthesias, and weakness.      PSYCHIATRIC:  Positive for anxiety ( (stable) ).          Past Medical History / Family History / Social History:         Last Reviewed on 5/29/2020 04:24 PM by Melina Pugh    Past Medical History:                 PAST MEDICAL HISTORY         Hypertension: dx'd in 2004;     Hyperthyroidism: dx'd in 2016;     Anxiety: dx'd in 2000;         GYNECOLOGICAL  HISTORY:    Last mammogram was 2018- normal; No history of abnormal mammograms         CURRENT MEDICAL PROVIDERS:    endocrinologist dr mello         PREVENTIVE HEALTH MAINTENANCE             COLORECTAL CANCER SCREENING: declines colorectal cancer screening, understands reason for testing     MAMMOGRAM: was last done 2018 with normal results         Surgical History:     Other Surgeries    Hysterectomy: 2004 complete;     right carpal tunnel surgery ;         Family History:     Father: aneurysm, stroke, hypertension     Mother: Congestive Heart Failure ( and dementia  age 79 ); Hypertension;  Anxiety     paternal aunt diabetes         Social History:         Marital Status:      Children: 2 children         Tobacco/Alcohol/Supplements:     Last Reviewed on 2019 04:18 PM by Marti Merida    Tobacco: Current Smoker: She currently smokes every day, 1/2 to 1 pack per day.          Current Problems:     Last Reviewed on 2020 04:23 PM by Melina Pugh    Anxiety disorder, unspecified    Essential (primary) hypertension    Vitamin D deficiency, unspecified    Tachycardia, unspecified        Immunizations:     None        Allergies:     Last Reviewed on 2019 04:18 PM by Marti Merida    Lamisil:          Current Medications:     Last Reviewed on 2019 04:18 PM by Marti Merida    Hydrochlorothiazide (HCTZ) 25mg Tablet [1 tab daily]    Paroxetine HCl 20mg Tablet [1/2 po q day]    Methimazole 5mg Tablet [Take 1 tablet(s) by mouth bid ]    Vitamin D2 2,000IU Tablet [Take 1 tablet(s) by mouth daily]        Objective:        Vitals:         Historical:     2019  BP:   146/77 mm Hg ( (left arm, , sitting, );) 2019  BP:   134/79 mm Hg ( (left arm, , sitting, );) 2019  P:   83bpm ( (left arm (BP Cuff), , sitting, );) 2019  P:   96bpm ( (left arm (BP Cuff), , sitting, );)     Assessment:         F41.9   Anxiety disorder, unspecified       I10   Essential  (primary) hypertension           ORDERS:         Meds Prescribed:       [Refilled] hydroCHLOROthiazide 25 mg oral tablet [1 tab daily], #90 (ninety) tablets, Refills: 1 (one)       [Refilled] Paroxetine HCl 20 mg oral tablet [1/2 po q day], #45 (forty five) tablets, Refills: 1 (one)                 Plan:         Anxiety disorder, unspecified    Telehealth: Verbal consent obtained for visit to occur via phone call; Total time spent was 10 minutes; 91894--Hnyxokpat E/M 5-10 minutes           Prescriptions:       [Refilled] Paroxetine HCl 20 mg oral tablet [1/2 po q day], #45 (forty five) tablets, Refills: 1 (one)         Essential (primary) hypertension        RECOMMENDATIONS given include: exercise, reduction of dietary salt intake, take medication as prescribed, try not to miss doses, and declines labs or mammogram or colonsocopy..            Prescriptions:       [Refilled] hydroCHLOROthiazide 25 mg oral tablet [1 tab daily], #90 (ninety) tablets, Refills: 1 (one)             Patient Recommendations:        For  Essential (primary) hypertension:    Maintain a regular exercise program. Reduce the amount of salt in your diet.              Charge Capture:         Primary Diagnosis:     F41.9  Anxiety disorder, unspecified           Orders:      25213  Phys/QHP telephone evaluation 5-10 min  (In-House)              I10  Essential (primary) hypertension

## 2021-05-18 NOTE — PROGRESS NOTES
Sturgeon, Martha S. 1960     Office/Outpatient Visit    Visit Date: Tue, Sep 11, 2018 04:20 pm    Provider: Melina Pugh N.P. (Assistant: Sarah Spurling, MA)    Location: [facility]        Electronically signed by Melina Pugh N.P. on  09/14/2018 09:06:13 PM                             SUBJECTIVE:        CC:     Selina is a 58 year old White female.  This is a follow-up visit.          HPI: seen with [employer] student         on supplement daily.          Additionally, she presents with history of tachycardia, NOS.  is aware likely due to hyperthyroidism.  declines beta blocker for fear of wt gain.          In regard to the anxiety, doing well on paroxetine.  denies side effects.  requests refills.          Concerning hypertension, her current cardiac medication regimen includes a diuretic ( hctz ).  She did not bring her blood pressure diary, but says that pressures have been well controlled.  She has been experiencing possible adverse medication effects, including hypokalemia.  Compliance with treatment has been good; she takes her medication as directed.          previously saw dr mello.  thyroid scan done.  declined one day tx due to requiring 3 day quarantine and insurance would not cover.  did not like dr mello.          Concerning swelling of arm, other details: left arm antecubital with scant swelling since blood draw a few months ago.    denies pain but consistent mild swelling.      ROS:     CONSTITUTIONAL:  Negative for chills, fatigue, fever, and weight change.      CARDIOVASCULAR:  Positive for tachycardia.   Negative for chest pain or palpitations.      RESPIRATORY:  Negative for cough, dyspnea, and hemoptysis.      GASTROINTESTINAL:  Negative for abdominal pain, heartburn, constipation, diarrhea, and stool changes.      MUSCULOSKELETAL:  Negative for arthralgias, back pain, and myalgias.      INTEGUMENTARY/BREAST:  Positive for swelling left arm.      NEUROLOGICAL:   Negative for dizziness, headaches, paresthesias, and weakness.      PSYCHIATRIC:  Positive for anxiety ( (stable) ).   Negative for sleep disturbance or suicidal thoughts.          PMH/FMH/SH:     Last Reviewed on 3/27/2018 05:18 PM by Melina Pugh    Past Medical History:                 PAST MEDICAL HISTORY         Hypertension: dx'd in 2004;     Hyperthyroidism: dx'd in 2016;     Anxiety: dx'd in 2000;         GYNECOLOGICAL HISTORY:    Last mammogram was 2018- normal; No history of abnormal mammograms         CURRENT MEDICAL PROVIDERS:    endocrinologist dr mello         PREVENTIVE HEALTH MAINTENANCE             COLORECTAL CANCER SCREENING: declines colorectal cancer screening, understands reason for testing     MAMMOGRAM: was last done march 2018 with normal results         Surgical History:     Other Surgeries    Hysterectomy: 2004 complete;     right carpal tunnel surgery 2008;         Family History:     Father: aneurysm, stroke, hypertension     Mother: Hypertension;  Anxiety     paternal aunt diabetes         Social History:         Marital Status:      Children: 2 children         Tobacco/Alcohol/Supplements:     Last Reviewed on 3/27/2018 04:32 PM by Doreen Nicholson    Tobacco: Current Smoker: She currently smokes every day, 1/2 to 1 pack per day.              Current Problems:     Tachycardia, NOS     Vitamin D deficiency, unspecified     Anxiety     Hypertension         Immunizations:     None        Allergies:     Last Reviewed on 3/27/2018 04:32 PM by Doreen Nicholson    Lamisil:        Current Medications:     Last Reviewed on 9/11/2018 04:24 PM by Spurling, Sarah C    Hydrochlorothiazide (HCTZ) 25mg Tablet 1 tab daily     Paroxetine HCl 20mg Tablet 1/2 po q day     Potassium Chloride 10mEq Tablets, Extended Release one a day     Vitamin D2 2,000IU Tablet Take 1 tablet(s) by mouth daily         OBJECTIVE:        Vitals:         Historical:     03/27/2018  BP:   138/68 mm Hg ( (left arm, ,  sitting, );)     03/27/2018  Wt:   163.8lbs        Current: 9/11/2018 4:30:26 PM    Ht:  5 ft, 0.75 in;  Wt: 158.8 lbs;  BMI: 30.3    T: 97.3 F (oral);  BP: 131/88 mm Hg (right arm, sitting);  P: 113 bpm (right arm (BP Cuff), sitting);  sCr: 0.69 mg/dL;  GFR: 86.00        Exams:     PHYSICAL EXAM:     GENERAL: vital signs recorded - well developed, well nourished;  no apparent distress;     RESPIRATORY: normal respiratory rate and pattern with no distress; normal breath sounds with no rales, rhonchi, wheezes or rubs;     CARDIOVASCULAR: normal rate; rhythm is regular;  no edema;     MUSCULOSKELETAL:  Normal range of motion, strength and tone;     NEUROLOGIC: mental status: alert and oriented x 3; GROSSLY INTACT     PSYCHIATRIC: appropriate affect and demeanor; normal psychomotor function;         ASSESSMENT           268.9   E55.9  Vitamin D deficiency, unspecified              DDx:     785.0   R00.0  Tachycardia, NOS              DDx:     300.02   F41.9  Anxiety              DDx:     401.1   I10  Hypertension              DDx:     242.90   E05.90  Hyperthyroidism              DDx:     276.8   E87.6  Hypokalemia              DDx:     729.81   R22.31  Swelling of arm              DDx:         ORDERS:         Meds Prescribed:       Refill of: Paroxetine HCl 20mg Tablet 1/2 po q day  #45 (Forty Five) tablet(s) Refills: 1       Refill of: Hydrochlorothiazide (HCTZ) 25mg Tablet 1 tab daily  #90 (Ninety) tablet(s) Refills: 1       Refill of: Potassium Chloride 10mEq Tablets, Extended Release one a day  #90 (Ninety) tablet(s) Refills: 1         Radiology/Test Orders:       [id number]  Venous doppler left extremity  (Send-Out)           Lab Orders:       [id number]  [facility] Basic Metabolic Panel  (Send-Out)         61801  [facility] - [facility] Thyroid panel with TSH ([id number], [id number])  (Send-Out)           Procedures Ordered:       REFER  Referral to Specialist or Other Facility  (Send-Out)                   PLAN:          Tachycardia, NOS          RECOMMENDATIONS given include: declines beta blocker.  see endocrinology. and discuss risks of untreated hyperthyroidism.           Anxiety           Prescriptions:       Refill of: Paroxetine HCl 20mg Tablet 1/2 po q day  #45 (Forty Five) tablet(s) Refills: 1          Hypertension         RECOMMENDATIONS given include: decrease consumption of alcohol, perform routine monitoring of blood pressure with home blood pressure cuff, reduction of dietary salt intake, take medication as prescribed, try not to miss doses, and Advised about free BP checks on the last Saturday of each month.            Prescriptions:       Refill of: Hydrochlorothiazide (HCTZ) 25mg Tablet 1 tab daily  #90 (Ninety) tablet(s) Refills: 1          Hyperthyroidism     LABORATORY:  Labs ordered to be performed today include Thyroid Panel.      REFERRALS:  Referral initiated to an endocrinologist.      RECOMMENDATIONS given include: requests caroline after january 1 due to no vacation time rest of this year.            Orders:       [id number]  [facility] Thyroid panel with TSH ([id number], [id number])  (Send-Out)         REFER  Referral to Specialist or Other Facility  (Send-Out)            Hypokalemia     LABORATORY:  Labs ordered to be performed today include basic metabolic panel.            Prescriptions:       Refill of: Potassium Chloride 10mEq Tablets, Extended Release one a day  #90 (Ninety) tablet(s) Refills: 1           Orders:       [id number]  [facility] Basic Metabolic Panel  (Send-Out)            Swelling of arm           Orders:       [id number]  Venous doppler left extremity  (Send-Out)               Patient Recommendations:        For  Hypertension:     Avoid alcohol as it can contribute to elevated blood pressure. Begin monitoring your blood pressure by brief nurse visits at our office, a home blood pressure monitor, or by checking on the machines in pharmacies or stores.  Keep a log of the readings. Reduce the amount of salt in your diet.               CHARGE CAPTURE           **Please note: ICD descriptions below are intended for billing purposes only and may not represent clinical diagnoses**        Primary Diagnosis:         268.9 Vitamin D deficiency, unspecified            E55.9    Vitamin D deficiency, unspecified              Orders:          [id number]   Office/outpatient visit; established patient, level 4  (In-House)           785.0 Tachycardia, NOS            R00.0    Tachycardia, unspecified    300.02 Anxiety            F41.9    Anxiety disorder, unspecified    401.1 Hypertension            I10    Essential (primary) hypertension    242.90 Hyperthyroidism            E05.90    Thyrotoxicosis, unspecified without thyrotoxic crisis or storm    276.8 Hypokalemia            E87.6    Hypokalemia    729.81 Swelling of arm            R22.31    Localized swelling, mass and lump, right upper limb        ADDENDUMS:      ____________________________________    Date: 09/17/2018 09:29 AM    Author: Beth Jones         Radiology Orders Faxed to:        [facility], X-ray ; Number [phone]            Date: 10/02/2018 11:40 AM    Author: Tamera Bailey         Visit Note Faxed to:        Adam Fabian (Endocrinology); Number [phone]

## 2021-07-01 VITALS
SYSTOLIC BLOOD PRESSURE: 137 MMHG | HEART RATE: 96 BPM | DIASTOLIC BLOOD PRESSURE: 73 MMHG | TEMPERATURE: 98.2 F | WEIGHT: 164.8 LBS | HEIGHT: 61 IN | BODY MASS INDEX: 31.11 KG/M2

## 2021-07-01 VITALS
TEMPERATURE: 97.6 F | WEIGHT: 157 LBS | BODY MASS INDEX: 29.64 KG/M2 | SYSTOLIC BLOOD PRESSURE: 139 MMHG | HEART RATE: 113 BPM | HEIGHT: 61 IN | DIASTOLIC BLOOD PRESSURE: 88 MMHG

## 2021-07-01 VITALS
TEMPERATURE: 97.5 F | DIASTOLIC BLOOD PRESSURE: 68 MMHG | SYSTOLIC BLOOD PRESSURE: 138 MMHG | HEART RATE: 105 BPM | HEIGHT: 61 IN | WEIGHT: 163.8 LBS | BODY MASS INDEX: 30.93 KG/M2

## 2021-07-01 VITALS
BODY MASS INDEX: 29.98 KG/M2 | WEIGHT: 158.8 LBS | HEART RATE: 113 BPM | TEMPERATURE: 97.3 F | SYSTOLIC BLOOD PRESSURE: 131 MMHG | DIASTOLIC BLOOD PRESSURE: 88 MMHG | HEIGHT: 61 IN

## 2021-07-01 VITALS
WEIGHT: 180.6 LBS | DIASTOLIC BLOOD PRESSURE: 77 MMHG | SYSTOLIC BLOOD PRESSURE: 146 MMHG | HEIGHT: 61 IN | BODY MASS INDEX: 34.1 KG/M2 | TEMPERATURE: 98.1 F | HEART RATE: 83 BPM

## 2021-07-01 VITALS
BODY MASS INDEX: 32.81 KG/M2 | DIASTOLIC BLOOD PRESSURE: 79 MMHG | SYSTOLIC BLOOD PRESSURE: 134 MMHG | HEART RATE: 96 BPM | HEIGHT: 61 IN | TEMPERATURE: 98.6 F | WEIGHT: 173.8 LBS

## 2021-07-02 VITALS
SYSTOLIC BLOOD PRESSURE: 142 MMHG | BODY MASS INDEX: 29.94 KG/M2 | HEART RATE: 86 BPM | TEMPERATURE: 97.9 F | WEIGHT: 158.6 LBS | HEIGHT: 61 IN | DIASTOLIC BLOOD PRESSURE: 74 MMHG

## 2021-07-02 VITALS — DIASTOLIC BLOOD PRESSURE: 77 MMHG | SYSTOLIC BLOOD PRESSURE: 146 MMHG | HEART RATE: 83 BPM

## 2021-07-22 ENCOUNTER — OFFICE VISIT (OUTPATIENT)
Dept: FAMILY MEDICINE CLINIC | Age: 61
End: 2021-07-22

## 2021-07-22 VITALS
WEIGHT: 163.2 LBS | DIASTOLIC BLOOD PRESSURE: 71 MMHG | HEART RATE: 91 BPM | SYSTOLIC BLOOD PRESSURE: 139 MMHG | TEMPERATURE: 98.1 F | HEIGHT: 61 IN | BODY MASS INDEX: 30.81 KG/M2

## 2021-07-22 DIAGNOSIS — F41.9 ANXIETY: ICD-10-CM

## 2021-07-22 DIAGNOSIS — I10 ESSENTIAL HYPERTENSION: Primary | ICD-10-CM

## 2021-07-22 DIAGNOSIS — R94.6 THYROID FUNCTION TEST ABNORMAL: ICD-10-CM

## 2021-07-22 DIAGNOSIS — Z12.31 BREAST CANCER SCREENING BY MAMMOGRAM: ICD-10-CM

## 2021-07-22 PROCEDURE — 99214 OFFICE O/P EST MOD 30 MIN: CPT | Performed by: NURSE PRACTITIONER

## 2021-07-22 RX ORDER — HYDROCHLOROTHIAZIDE 25 MG/1
25 TABLET ORAL DAILY
COMMUNITY
Start: 2021-05-18 | End: 2021-07-22 | Stop reason: SDUPTHER

## 2021-07-22 RX ORDER — PAROXETINE HYDROCHLORIDE 20 MG/1
0.5 TABLET, FILM COATED ORAL DAILY
COMMUNITY
Start: 2021-05-18 | End: 2021-07-22 | Stop reason: SDUPTHER

## 2021-07-22 RX ORDER — PAROXETINE HYDROCHLORIDE 20 MG/1
10 TABLET, FILM COATED ORAL DAILY
Qty: 90 TABLET | Refills: 1 | Status: SHIPPED | OUTPATIENT
Start: 2021-07-22 | End: 2022-01-26 | Stop reason: SDUPTHER

## 2021-07-22 RX ORDER — HYDROCHLOROTHIAZIDE 25 MG/1
25 TABLET ORAL DAILY
Qty: 90 TABLET | Refills: 1 | Status: SHIPPED | OUTPATIENT
Start: 2021-07-22 | End: 2022-01-17

## 2021-07-22 NOTE — PROGRESS NOTES
"Chief Complaint  Hypertension and Med Refill    Subjective  Melanie is a 61-year-old female in today for follow-up on essential hypertension.  She states her blood pressure is under good control on hydrochlorothiazide 25 mg once daily.  She denies medication side effects, chest pain, or swelling in her feet or ankles.  Her last mammogram was in March 2018 and normal.  She performs self breast exams at home and no longer follows gynecology due to history of a complete hysterectomy in 2004.  She would like to have a mammogram ordered.  Anxiety is stable on Paxil she takes one half of a 20 mg tablet once daily and denies side effects and would like to continue treatment and requests refills.  Of note she had a recent right wrist fracture and is wearing a cast and is being treated by Dr. Zenon Car.  She retired from [employer].        Martha S Sturgeon presents to [facility]    Review of Systems   Constitutional: Negative.    Respiratory: Negative.    Cardiovascular: Negative.    Genitourinary: Negative.    Musculoskeletal:        CastWearing to right lower arm.  Denies current pain being treated for right wrist fracture with follow-up with orthopedics on Monday   Neurological: Negative.    Psychiatric/Behavioral:        Anxiety is stable        Objective   Vital Signs:   Vitals:    07/22/21 1051 07/22/21 1114   BP: 139/71    BP Location: Left arm    Patient Position: Sitting    Pulse: (!) 127 91   Temp: 98.1 °F (36.7 °C)    TempSrc: Oral    Weight: 74 kg (163 lb 3.2 oz)    Height: 154.3 cm (60.75\")       Physical Exam  Vitals reviewed.   Constitutional:       General: She is not in acute distress.     Appearance: Normal appearance. She is well-developed.   Neck:      Thyroid: No thyroid mass, thyromegaly or thyroid tenderness.   Cardiovascular:      Rate and Rhythm: Normal rate and regular rhythm.      Pulses:           Posterior tibial pulses are 2+ on the right side and 2+ on the left " side.      Heart sounds: Normal heart sounds.   Pulmonary:      Effort: Pulmonary effort is normal.      Breath sounds: Normal breath sounds.   Musculoskeletal:      Right lower leg: No edema.      Left lower leg: No edema.   Skin:     General: Skin is warm and dry.   Neurological:      General: No focal deficit present.      Mental Status: She is alert.   Psychiatric:         Attention and Perception: Attention normal.         Mood and Affect: Mood and affect normal.         Behavior: Behavior normal.          Result Review :              Assessment and Plan    Diagnoses and all orders for this visit:    1. Essential hypertension (Primary)  Assessment & Plan:  Continue current treatment and she will return for fasting lab work.    Orders:  -     Comprehensive metabolic panel; Future  -     Lipid Panel  -     hydroCHLOROthiazide (HYDRODIURIL) 25 MG tablet; Take 1 tablet by mouth Daily.  Dispense: 90 tablet; Refill: 1    2. Anxiety  Assessment & Plan:  Continue current treatment and follow-up in 6 months or sooner if concerns    Orders:  -     PARoxetine (PAXIL) 20 MG tablet; Take 0.5 tablets by mouth Daily.  Dispense: 90 tablet; Refill: 1    3. Breast cancer screening by mammogram  Assessment & Plan:  Declines breast exam today.  Mammogram is ordered and continues monthly self breast exams and report any changes    Orders:  -     Mammo Screening Digital Tomosynthesis Bilateral With CAD; Future    4. Thyroid function test abnormal  Assessment & Plan:  Previously saw endocrinology for some hyperthyroidism but declined treatment at that time.  She feels as if she is having no current concerns but is okay with seeing what current thyroid levels are when she returns for her lab work in the short-term    Orders:  -     Thyroid Panel With TSH; Future      Follow Up    No follow-ups on file.  Patient was given instructions and counseling regarding her condition or for health maintenance advice. Please see specific  information pulled into the AVS if appropriate.

## 2021-07-22 NOTE — ASSESSMENT & PLAN NOTE
Previously saw endocrinology for some hyperthyroidism but declined treatment at that time.  She feels as if she is having no current concerns but is okay with seeing what current thyroid levels are when she returns for her lab work in the short-term

## 2021-07-22 NOTE — ASSESSMENT & PLAN NOTE
Declines breast exam today.  Mammogram is ordered and continues monthly self breast exams and report any changes

## 2021-08-19 ENCOUNTER — HOSPITAL ENCOUNTER (OUTPATIENT)
Dept: MAMMOGRAPHY | Facility: HOSPITAL | Age: 61
Discharge: HOME | End: 2021-08-19
Admitting: NURSE PRACTITIONER

## 2021-08-19 DIAGNOSIS — Z12.31 BREAST CANCER SCREENING BY MAMMOGRAM: ICD-10-CM

## 2021-08-19 PROCEDURE — 77067 SCR MAMMO BI INCL CAD: CPT

## 2021-08-19 PROCEDURE — 77063 BREAST TOMOSYNTHESIS BI: CPT

## 2022-01-17 DIAGNOSIS — I10 ESSENTIAL HYPERTENSION: ICD-10-CM

## 2022-01-17 RX ORDER — HYDROCHLOROTHIAZIDE 25 MG/1
TABLET ORAL
Qty: 90 TABLET | Refills: 0 | Status: SHIPPED | OUTPATIENT
Start: 2022-01-17 | End: 2022-01-26 | Stop reason: SDUPTHER

## 2022-01-26 ENCOUNTER — TELEMEDICINE (OUTPATIENT)
Dept: FAMILY MEDICINE CLINIC | Age: 62
End: 2022-01-26

## 2022-01-26 DIAGNOSIS — I10 ESSENTIAL HYPERTENSION: Primary | ICD-10-CM

## 2022-01-26 DIAGNOSIS — R94.6 THYROID FUNCTION TEST ABNORMAL: ICD-10-CM

## 2022-01-26 DIAGNOSIS — F41.9 ANXIETY: ICD-10-CM

## 2022-01-26 PROCEDURE — 99213 OFFICE O/P EST LOW 20 MIN: CPT | Performed by: NURSE PRACTITIONER

## 2022-01-26 RX ORDER — HYDROCHLOROTHIAZIDE 25 MG/1
25 TABLET ORAL DAILY
Qty: 90 TABLET | Refills: 1 | Status: SHIPPED | OUTPATIENT
Start: 2022-01-26 | End: 2022-07-26 | Stop reason: SDUPTHER

## 2022-01-26 RX ORDER — PAROXETINE HYDROCHLORIDE 20 MG/1
10 TABLET, FILM COATED ORAL DAILY
Qty: 45 TABLET | Refills: 1 | Status: SHIPPED | OUTPATIENT
Start: 2022-01-26 | End: 2022-07-26 | Stop reason: SDUPTHER

## 2022-01-26 NOTE — PROGRESS NOTES
Chief Complaint  Hypertension (6 month follow up - med refills - video visit -[phone])    Subjective  Melanie is a 61 yr old female who consents to televideo visit today.  Audio and video are utilized.  No other persons present .  Report blood pressure under good control on HCTZ 25 mg once daily.  Denies side effects and requests refills.    Anxiety controlled on paroxetine one half of a 20 mg tablet once daily.  Denies side effects and requests refills.        Martha S Sturgeon presents to [facility]    Review of Systems   Constitutional: Negative.    Respiratory: Negative.    Cardiovascular: Negative.    Psychiatric/Behavioral: Negative.         Objective   Vital Signs:   There were no vitals filed for this visit.   Physical Exam  Constitutional:       General: She is not in acute distress.     Appearance: Normal appearance.   Neurological:      General: No focal deficit present.      Mental Status: She is alert.   Psychiatric:         Mood and Affect: Mood normal.          Result Review :                Assessment and Plan    Diagnoses and all orders for this visit:    1. Essential hypertension (Primary)  Assessment & Plan:  Continue current tx.  Follow up in 6 months or sooner if concerns.  Encourage regular monitoring of lab work.      Orders:  -     hydroCHLOROthiazide (HYDRODIURIL) 25 MG tablet; Take 1 tablet by mouth Daily.  Dispense: 90 tablet; Refill: 1  -     Comprehensive metabolic panel; Future  -     Lipid panel; Future    2. Thyroid function test abnormal  -     Thyroid Panel With TSH; Future    3. Anxiety  -     PARoxetine (PAXIL) 20 MG tablet; Take 0.5 tablets by mouth Daily.  Dispense: 45 tablet; Refill: 1      Follow Up    No follow-ups on file.  Patient was given instructions and counseling regarding her condition or for health maintenance advice. Please see specific information pulled into the AVS if appropriate.

## 2022-03-21 ENCOUNTER — LAB (OUTPATIENT)
Dept: LAB | Facility: HOSPITAL | Age: 62
End: 2022-03-21

## 2022-03-21 DIAGNOSIS — I10 ESSENTIAL HYPERTENSION: ICD-10-CM

## 2022-03-21 DIAGNOSIS — R94.6 THYROID FUNCTION TEST ABNORMAL: ICD-10-CM

## 2022-03-21 LAB
ALBUMIN SERPL-MCNC: 4.4 G/DL (ref 3.5–5.2)
ALBUMIN/GLOB SERPL: 1.3 G/DL
ALP SERPL-CCNC: 75 U/L (ref 39–117)
ALT SERPL W P-5'-P-CCNC: 8 U/L (ref 1–33)
ANION GAP SERPL CALCULATED.3IONS-SCNC: 10.5 MMOL/L (ref 5–15)
AST SERPL-CCNC: 13 U/L (ref 1–32)
BILIRUB SERPL-MCNC: 0.3 MG/DL (ref 0–1.2)
BUN SERPL-MCNC: 15 MG/DL (ref 8–23)
BUN/CREAT SERPL: 16.7 (ref 7–25)
CALCIUM SPEC-SCNC: 9.5 MG/DL (ref 8.6–10.5)
CHLORIDE SERPL-SCNC: 101 MMOL/L (ref 98–107)
CHOLEST SERPL-MCNC: 201 MG/DL (ref 0–200)
CO2 SERPL-SCNC: 26.5 MMOL/L (ref 22–29)
CREAT SERPL-MCNC: 0.9 MG/DL (ref 0.57–1)
EGFRCR SERPLBLD CKD-EPI 2021: 72 ML/MIN/1.73
GLOBULIN UR ELPH-MCNC: 3.3 GM/DL
GLUCOSE SERPL-MCNC: 90 MG/DL (ref 65–99)
HDLC SERPL-MCNC: 64 MG/DL (ref 40–60)
LDLC SERPL CALC-MCNC: 122 MG/DL (ref 0–100)
LDLC/HDLC SERPL: 1.88 {RATIO}
POTASSIUM SERPL-SCNC: 4.2 MMOL/L (ref 3.5–5.2)
PROT SERPL-MCNC: 7.7 G/DL (ref 6–8.5)
SODIUM SERPL-SCNC: 138 MMOL/L (ref 136–145)
T-UPTAKE NFR SERPL: 1.12 TBI (ref 0.8–1.3)
T4 SERPL-MCNC: 7.17 MCG/DL (ref 4.5–11.7)
TRIGL SERPL-MCNC: 84 MG/DL (ref 0–150)
TSH SERPL DL<=0.05 MIU/L-ACNC: 1.86 UIU/ML (ref 0.27–4.2)
VLDLC SERPL-MCNC: 15 MG/DL (ref 5–40)

## 2022-03-21 PROCEDURE — 84443 ASSAY THYROID STIM HORMONE: CPT

## 2022-03-21 PROCEDURE — 84436 ASSAY OF TOTAL THYROXINE: CPT

## 2022-03-21 PROCEDURE — 84479 ASSAY OF THYROID (T3 OR T4): CPT

## 2022-03-21 PROCEDURE — 36415 COLL VENOUS BLD VENIPUNCTURE: CPT

## 2022-03-21 PROCEDURE — 80061 LIPID PANEL: CPT

## 2022-03-21 PROCEDURE — 80053 COMPREHEN METABOLIC PANEL: CPT

## 2022-03-24 NOTE — PROGRESS NOTES
Normal blood sugar, kidney, and liver function.  Thyroid panel was normal.  Total cholesterol and LDL cholesterol are mildly elevated.  HDL is actually your good cholesterol level.  I recommend a lower saturated fat diet and exercise to help lower total and LDL cholesterol levels.

## 2022-07-26 ENCOUNTER — OFFICE VISIT (OUTPATIENT)
Dept: FAMILY MEDICINE CLINIC | Age: 62
End: 2022-07-26

## 2022-07-26 VITALS
DIASTOLIC BLOOD PRESSURE: 84 MMHG | BODY MASS INDEX: 31.15 KG/M2 | SYSTOLIC BLOOD PRESSURE: 134 MMHG | WEIGHT: 165 LBS | HEART RATE: 82 BPM | OXYGEN SATURATION: 97 % | HEIGHT: 61 IN

## 2022-07-26 DIAGNOSIS — F41.9 ANXIETY: Primary | ICD-10-CM

## 2022-07-26 DIAGNOSIS — I10 ESSENTIAL HYPERTENSION: ICD-10-CM

## 2022-07-26 DIAGNOSIS — H69.83 EUSTACHIAN TUBE DYSFUNCTION, BILATERAL: ICD-10-CM

## 2022-07-26 DIAGNOSIS — G47.00 INSOMNIA, UNSPECIFIED TYPE: ICD-10-CM

## 2022-07-26 PROBLEM — H69.93 EUSTACHIAN TUBE DYSFUNCTION, BILATERAL: Status: ACTIVE | Noted: 2022-07-26

## 2022-07-26 PROCEDURE — 99214 OFFICE O/P EST MOD 30 MIN: CPT | Performed by: NURSE PRACTITIONER

## 2022-07-26 RX ORDER — PAROXETINE HYDROCHLORIDE 20 MG/1
10 TABLET, FILM COATED ORAL DAILY
Qty: 45 TABLET | Refills: 1 | Status: SHIPPED | OUTPATIENT
Start: 2022-07-26 | End: 2023-01-18

## 2022-07-26 RX ORDER — HYDROCHLOROTHIAZIDE 25 MG/1
25 TABLET ORAL DAILY
Qty: 90 TABLET | Refills: 1 | Status: SHIPPED | OUTPATIENT
Start: 2022-07-26 | End: 2023-01-24 | Stop reason: SDUPTHER

## 2022-07-26 RX ORDER — HYDROXYZINE HYDROCHLORIDE 25 MG/1
25 TABLET, FILM COATED ORAL NIGHTLY
Qty: 30 TABLET | Refills: 5 | Status: SHIPPED | OUTPATIENT
Start: 2022-07-26 | End: 2022-08-11 | Stop reason: SDUPTHER

## 2022-07-26 NOTE — PROGRESS NOTES
"Answers for HPI/ROS submitted by the patient on 7/19/2022  Please describe your symptoms.: Just getting my persciptions refilled.  Have you had these symptoms before?: No  How long have you been having these symptoms?: 1-4 days  Please list any medications you are currently taking for this condition.: None  Please describe any probable cause for these symptoms. : None  What is the primary reason for your visit?: Other    Chief Complaint  Hypertension (6 month ) and Anxiety    Subjective  Patient is a 62-year-old female who is here today to follow-up on essential hypertension.  She reports blood pressures under good control on hydrochlorothiazide 25 mg once daily.  Feels as if blood pressure was more elevated today because of driving in heavy rain to come for appointment.  She takes hydrochlorothiazide 25 mg once daily.  Denies side effects and requests refills.    Anxiety is stable with use of paroxetine 20 mg tablet taking 1/2 tablet daily.  Denies side effects and requests refills.    Difficulty falling asleep and staying asleep at nighttime for more than several years.  Tylenol PM is no longer as effective and melatonin previously was not effective.  States she cannot turn her mind off at night.  Denies any exacerbation of anxiety or caffeine intake in the afternoon or later.  Would like to try different medication for insomnia.    Complains of mild lightheadedness when he she looks up or looks down.  Denies any chest pain, heart palpitation, headaches or any other associated symptoms.  Denies any concerns for current seasonal allergies.        Martha S Sturgeon presents to [facility]          Objective   Vital Signs:   Vitals:    07/26/22 1027 07/26/22 1106   BP: 160/91 134/84   BP Location: Left arm Right arm   Patient Position: Sitting Sitting   Cuff Size: Adult Large Adult   Pulse: 82    SpO2: 97%    Weight: 74.8 kg (165 lb)    Height: 154.3 cm (60.75\")       Body mass index " is 31.43 kg/m².  Physical Exam  Vitals reviewed.   Constitutional:       General: She is not in acute distress.     Appearance: Normal appearance. She is well-developed.   HENT:      Right Ear: A middle ear effusion is present.      Left Ear: A middle ear effusion is present.   Neck:      Thyroid: No thyroid mass.   Cardiovascular:      Rate and Rhythm: Normal rate and regular rhythm.      Pulses:           Posterior tibial pulses are 2+ on the right side and 2+ on the left side.      Heart sounds: Normal heart sounds.   Pulmonary:      Effort: Pulmonary effort is normal.      Breath sounds: Normal breath sounds.   Musculoskeletal:      Right lower leg: No edema.      Left lower leg: No edema.   Skin:     General: Skin is warm and dry.   Neurological:      General: No focal deficit present.      Mental Status: She is alert.   Psychiatric:         Attention and Perception: Attention normal.         Mood and Affect: Mood and affect normal.         Behavior: Behavior normal.          Result Review :     CMP    CMP 3/21/22   Glucose 90   BUN 15   Creatinine 0.90   Sodium 138   Potassium 4.2   Chloride 101   Calcium 9.5   Albumin 4.40   Total Bilirubin 0.3   Alkaline Phosphatase 75   AST (SGOT) 13   ALT (SGPT) 8                 Lipid Panel    Lipid Panel 3/21/22   Total Cholesterol 201 (A)   Triglycerides 84   HDL Cholesterol 64 (A)   VLDL Cholesterol 15   LDL Cholesterol  122 (A)   LDL/HDL Ratio 1.88   (A) Abnormal value            TSH    TSH 3/21/22   TSH 1.860                    Assessment and Plan    Diagnoses and all orders for this visit:    1. Anxiety (Primary)  -     PARoxetine (PAXIL) 20 MG tablet; Take 0.5 tablets by mouth Daily.  Dispense: 45 tablet; Refill: 1    2. Essential hypertension  Assessment & Plan:  Monitor blood pressure at home and report any elevated readings.  Follow-up in 6 months or sooner if concerns.    Orders:  -     hydroCHLOROthiazide (HYDRODIURIL) 25 MG tablet; Take 1 tablet by mouth  Daily.  Dispense: 90 tablet; Refill: 1    3. Insomnia, unspecified type  Assessment & Plan:  No caffeine after 2 PM.  Start trial of hydroxyzine at bedtime as needed.  Follow-up if not improving.    Orders:  -     hydrOXYzine (ATARAX) 25 MG tablet; Take 1 tablet by mouth Every Night.  Dispense: 30 tablet; Refill: 5    4. Eustachian tube dysfunction, bilateral  Assessment & Plan:  Start Flonase or Nasacort no spray that she can purchase over-the-counter.  Follow-up if not improving.  If experiencing any allergy or nasal symptoms can also add either plain Claritin, Zyrtec, or Allegra.        Follow Up    Return in about 6 months (around 1/26/2023).  Patient was given instructions and counseling regarding her condition or for health maintenance advice. Please see specific information pulled into the AVS if appropriate.

## 2022-07-26 NOTE — ASSESSMENT & PLAN NOTE
Start Flonase or Nasacort no spray that she can purchase over-the-counter.  Follow-up if not improving.  If experiencing any allergy or nasal symptoms can also add either plain Claritin, Zyrtec, or Allegra.

## 2022-07-26 NOTE — ASSESSMENT & PLAN NOTE
Monitor blood pressure at home and report any elevated readings.  Follow-up in 6 months or sooner if concerns.

## 2022-07-26 NOTE — ASSESSMENT & PLAN NOTE
No caffeine after 2 PM.  Start trial of hydroxyzine at bedtime as needed.  Follow-up if not improving.

## 2022-08-09 ENCOUNTER — TELEPHONE (OUTPATIENT)
Dept: FAMILY MEDICINE CLINIC | Age: 62
End: 2022-08-09

## 2022-08-09 NOTE — TELEPHONE ENCOUNTER
Caller: Sturgeon, Martha S    Relationship: Self    Best call back number: [phone]    What is the best time to reach you: ANY    Who are you requesting to speak with (clinical staff, provider,  specific staff member): CLINICAL STAFF    What was the call regarding: PATIENT CALLED STATING THAT SHE WOULD LIKE TO SPEAK TO A NURSE ABOUT HER MEDICATIONS    Do you require a callback: YES

## 2022-08-09 NOTE — TELEPHONE ENCOUNTER
Pt states that the Atarax didn't help at 1 or 2 tabs qhs, so she has tried 3 tabs and it worked fine, but she wants to know if that is ok to take that much? Or should she try something else?

## 2022-08-11 ENCOUNTER — TRANSCRIBE ORDERS (OUTPATIENT)
Dept: ADMINISTRATIVE | Facility: HOSPITAL | Age: 62
End: 2022-08-11

## 2022-08-11 DIAGNOSIS — G47.00 INSOMNIA, UNSPECIFIED TYPE: ICD-10-CM

## 2022-08-11 DIAGNOSIS — Z12.31 SCREENING MAMMOGRAM FOR HIGH-RISK PATIENT: Primary | ICD-10-CM

## 2022-08-11 RX ORDER — HYDROXYZINE HYDROCHLORIDE 25 MG/1
TABLET, FILM COATED ORAL
Qty: 90 TABLET | Refills: 5 | Status: SHIPPED | OUTPATIENT
Start: 2022-08-11 | End: 2023-01-24

## 2022-08-11 NOTE — TELEPHONE ENCOUNTER
She could take up to 100 mg hydroxyzine at bedtime per current guidelines for insomnia.  She has 25 mg tablets.  3 tablets equal 75 mg at bedtime.  The higher the dose does mean an increased risk of side effects such as dry mouth or next day drowsiness.  Let me know if any further concerns.

## 2022-09-14 ENCOUNTER — HOSPITAL ENCOUNTER (OUTPATIENT)
Dept: MAMMOGRAPHY | Facility: HOSPITAL | Age: 62
Discharge: HOME | End: 2022-09-14
Admitting: NURSE PRACTITIONER

## 2022-09-14 DIAGNOSIS — Z12.31 SCREENING MAMMOGRAM FOR HIGH-RISK PATIENT: ICD-10-CM

## 2022-09-14 PROCEDURE — 77063 BREAST TOMOSYNTHESIS BI: CPT

## 2022-09-14 PROCEDURE — 77067 SCR MAMMO BI INCL CAD: CPT

## 2022-09-15 NOTE — PROGRESS NOTES
Your mammogram is negative.  Fibroglandular density is not uncommon.  Please continue monthly self breast exams and report any changes.

## 2022-12-28 DIAGNOSIS — I10 ESSENTIAL HYPERTENSION: ICD-10-CM

## 2022-12-28 RX ORDER — HYDROCHLOROTHIAZIDE 25 MG/1
TABLET ORAL
Qty: 90 TABLET | Refills: 0 | OUTPATIENT
Start: 2022-12-28

## 2023-01-18 DIAGNOSIS — F41.9 ANXIETY: ICD-10-CM

## 2023-01-18 RX ORDER — PAROXETINE HYDROCHLORIDE 20 MG/1
TABLET, FILM COATED ORAL
Qty: 45 TABLET | Refills: 0 | Status: SHIPPED | OUTPATIENT
Start: 2023-01-18 | End: 2023-01-24 | Stop reason: SDUPTHER

## 2023-01-24 ENCOUNTER — OFFICE VISIT (OUTPATIENT)
Dept: FAMILY MEDICINE CLINIC | Age: 63
End: 2023-01-24
Payer: COMMERCIAL

## 2023-01-24 VITALS
HEART RATE: 75 BPM | WEIGHT: 165 LBS | SYSTOLIC BLOOD PRESSURE: 136 MMHG | DIASTOLIC BLOOD PRESSURE: 74 MMHG | BODY MASS INDEX: 31.15 KG/M2 | OXYGEN SATURATION: 99 % | HEIGHT: 61 IN

## 2023-01-24 DIAGNOSIS — Z86.39 HISTORY OF HYPERTHYROIDISM: ICD-10-CM

## 2023-01-24 DIAGNOSIS — I10 ESSENTIAL HYPERTENSION: Primary | ICD-10-CM

## 2023-01-24 DIAGNOSIS — G47.00 INSOMNIA, UNSPECIFIED TYPE: ICD-10-CM

## 2023-01-24 DIAGNOSIS — F41.9 ANXIETY: ICD-10-CM

## 2023-01-24 PROBLEM — R94.6 THYROID FUNCTION TEST ABNORMAL: Status: RESOLVED | Noted: 2021-07-22 | Resolved: 2023-01-24

## 2023-01-24 PROCEDURE — 99214 OFFICE O/P EST MOD 30 MIN: CPT | Performed by: NURSE PRACTITIONER

## 2023-01-24 RX ORDER — HYDROCHLOROTHIAZIDE 25 MG/1
25 TABLET ORAL DAILY
Qty: 90 TABLET | Refills: 1 | Status: SHIPPED | OUTPATIENT
Start: 2023-01-24

## 2023-01-24 RX ORDER — TRAZODONE HYDROCHLORIDE 50 MG/1
50 TABLET ORAL NIGHTLY
Qty: 30 TABLET | Refills: 5 | Status: SHIPPED | OUTPATIENT
Start: 2023-01-24

## 2023-01-24 RX ORDER — PAROXETINE HYDROCHLORIDE 20 MG/1
10 TABLET, FILM COATED ORAL DAILY
Qty: 45 TABLET | Refills: 1 | Status: SHIPPED | OUTPATIENT
Start: 2023-01-24

## 2023-01-24 NOTE — PROGRESS NOTES
"Chief Complaint  Anxiety (6 month follow up - due for colonoscopy ), Hypertension, and Insomnia    Subjective          Martha S Sturgeon presents to [facility]     Regarding anxiety.  She does take paroxetine 20 mg tablet 1/2 tablet once daily.  Denies side effects and requests refills.  She also has some associated insomnia long-term and most often due to not be able to turn her mind off at night.  She is told that she snores sometimes.  Has never had a sleep study done.  Tylenol PM is effective but has to take 6 or more tablets for to be effective.  Previously tried melatonin and hydroxyzine which were of no benefit.  Feels as if anxiety is stable.    Hypertension she takes hydrochlorothiazide 25 mg daily.  Denies side effects and requests refills.    History of mild thyromegaly and hyperthyroidism.  Last thyroid function test was within normal limits.  Patient defers any further management at this time.  Denies concerns.     Objective   Vital Signs:   Vitals:    01/24/23 1013 01/24/23 1049   BP: 150/77 136/74   BP Location: Left arm Right arm   Patient Position: Sitting Sitting   Cuff Size: Large Adult Adult   Pulse: 75    SpO2: 99%    Weight: 74.8 kg (165 lb)    Height: 154.3 cm (60.75\")       Body mass index is 31.43 kg/m².  Physical Exam  Vitals reviewed.   Constitutional:       General: She is not in acute distress.     Appearance: Normal appearance. She is well-developed.   Neck:      Comments: Mild diffuse thyromegaly  Cardiovascular:      Rate and Rhythm: Normal rate and regular rhythm.      Heart sounds: Normal heart sounds.   Pulmonary:      Effort: Pulmonary effort is normal.      Breath sounds: Normal breath sounds.   Musculoskeletal:      Right lower leg: No edema.      Left lower leg: No edema.   Skin:     General: Skin is warm and dry.   Neurological:      General: No focal deficit present.      Mental Status: She is alert.   Psychiatric:         Attention and " Perception: Attention normal.         Mood and Affect: Mood and affect normal.         Behavior: Behavior normal.           Current Outpatient Medications:   •  hydroCHLOROthiazide (HYDRODIURIL) 25 MG tablet, Take 1 tablet by mouth Daily., Disp: 90 tablet, Rfl: 1  •  PARoxetine (PAXIL) 20 MG tablet, Take 0.5 tablets by mouth Daily., Disp: 45 tablet, Rfl: 1  •  vitamin D3 125 MCG (5000 UT) capsule capsule, Take 10,000 Units by mouth Daily., Disp: , Rfl:   •  traZODone (DESYREL) 50 MG tablet, Take 1 tablet by mouth Every Night., Disp: 30 tablet, Rfl: 5       Result Review :     CMP    CMP 3/21/22   Glucose 90   BUN 15   Creatinine 0.90   eGFR 72.0   Sodium 138   Potassium 4.2   Chloride 101   Calcium 9.5   Total Protein 7.7   Albumin 4.40   Globulin 3.3   Total Bilirubin 0.3   Alkaline Phosphatase 75   AST (SGOT) 13   ALT (SGPT) 8   Albumin/Globulin Ratio 1.3   BUN/Creatinine Ratio 16.7   Anion Gap 10.5      Comments are available for some flowsheets but are not being displayed.                 Lipid Panel    Lipid Panel 3/21/22   Total Cholesterol 201 (A)   Triglycerides 84   HDL Cholesterol 64 (A)   VLDL Cholesterol 15   LDL Cholesterol  122 (A)   LDL/HDL Ratio 1.88   (A) Abnormal value            TSH    TSH 3/21/22   TSH 1.860                    Assessment and Plan    Diagnoses and all orders for this visit:    1. Essential hypertension (Primary)  Assessment & Plan:  Monitor blood pressure at home and report any elevated readings.  Follow-up in 6 months or sooner if concerns.    Orders:  -     hydroCHLOROthiazide (HYDRODIURIL) 25 MG tablet; Take 1 tablet by mouth Daily.  Dispense: 90 tablet; Refill: 1    2. Anxiety  -     PARoxetine (PAXIL) 20 MG tablet; Take 0.5 tablets by mouth Daily.  Dispense: 45 tablet; Refill: 1    3. Insomnia, unspecified type  -     traZODone (DESYREL) 50 MG tablet; Take 1 tablet by mouth Every Night.  Dispense: 30 tablet; Refill: 5    4. History of hyperthyroidism  Assessment & Plan:  Did  not take methimazole long-term.  Last thyroid function test was normal.  She had ultrasound imaging of her thyroid in 2016 and 2019 with no changes.  Denies concerns.        Follow Up    No follow-ups on file.  Patient was given instructions and counseling regarding her condition or for health maintenance advice. Please see specific information pulled into the AVS if appropriate.       Answers for HPI/ROS submitted by the patient on 1/22/2023  Please describe your symptoms.: Need refills on medication  Have you had these symptoms before?: No  How long have you been having these symptoms?: 1-4 days  What is the primary reason for your visit?: Other

## 2023-01-25 NOTE — ASSESSMENT & PLAN NOTE
Did not take methimazole long-term.  Last thyroid function test was normal.  She had ultrasound imaging of her thyroid in 2016 and 2019 with no changes.  Denies concerns.

## 2023-07-25 ENCOUNTER — OFFICE VISIT (OUTPATIENT)
Dept: FAMILY MEDICINE CLINIC | Age: 63
End: 2023-07-25
Payer: COMMERCIAL

## 2023-07-25 VITALS
HEART RATE: 80 BPM | HEIGHT: 61 IN | WEIGHT: 161 LBS | SYSTOLIC BLOOD PRESSURE: 140 MMHG | BODY MASS INDEX: 30.4 KG/M2 | OXYGEN SATURATION: 96 % | DIASTOLIC BLOOD PRESSURE: 90 MMHG

## 2023-07-25 DIAGNOSIS — I10 ESSENTIAL HYPERTENSION: Primary | ICD-10-CM

## 2023-07-25 DIAGNOSIS — Z11.59 SCREENING FOR VIRAL DISEASE: ICD-10-CM

## 2023-07-25 DIAGNOSIS — F41.9 ANXIETY: ICD-10-CM

## 2023-07-25 PROCEDURE — 99213 OFFICE O/P EST LOW 20 MIN: CPT | Performed by: NURSE PRACTITIONER

## 2023-07-25 RX ORDER — HYDROCHLOROTHIAZIDE 25 MG/1
25 TABLET ORAL DAILY
Qty: 90 TABLET | Refills: 1 | Status: SHIPPED | OUTPATIENT
Start: 2023-07-25

## 2023-07-25 RX ORDER — PAROXETINE HYDROCHLORIDE 20 MG/1
10 TABLET, FILM COATED ORAL DAILY
Qty: 45 TABLET | Refills: 1 | Status: SHIPPED | OUTPATIENT
Start: 2023-07-25

## 2023-07-25 NOTE — ASSESSMENT & PLAN NOTE
Monitor blood pressure at home and report any elevated readings.  Goal is for blood pressure be 130/85 or lower.  Further treatment pending lab results.  Follow-up in 6 months or sooner if concerns.

## 2023-07-25 NOTE — PROGRESS NOTES
"Chief Complaint  Hypertension, Anxiety, and Insomnia    Subjective          Martha S Sturgeon presents to [facility]     Patient is a 63-year-old female who is here today to follow-up regarding hypertension.  She takes hydrochlorothiazide 25 mg daily.  Does not routinely monitor blood pressure.  Feels that blood pressures under good control.  Denies side effects of hydrochlorothiazide and requests refills.  Denies chest pain or shortness of breath or edema.    Anxiety is stable on paroxetine one half of a 20 mg tablet daily.  Denies side effects and requests refills.    Trazodone did not help insomnia when taking 50 or 100 mg at bedtime.  Hydroxyzine was previously ineffective.  Patient states Tylenol PM helps better than any other intervention.  Does not feel the need to change any current treatment.  Understands the need to monitor kidney and liver function with any regular use of Tylenol or NSAID products.    Patient has not yet had colon cancer screening.  Family history is negative for colon cancer or colon polyps.  She is advised of methods for colon cancer screening including Cologuard and colonoscopy.  Patient defers at this time.  She will let me know when she wants to pursue colon cancer screening.     Objective   Vital Signs:   Vitals:    07/25/23 0841 07/25/23 0916   BP: 178/90 140/90   BP Location: Left arm Right arm   Patient Position: Sitting Sitting   Cuff Size: Adult Adult   Pulse: 80    SpO2: 96%    Weight: 73 kg (161 lb)    Height: 154.3 cm (60.75\")       Body mass index is 30.67 kg/m².           Physical Exam  Vitals reviewed.   Constitutional:       General: She is not in acute distress.     Appearance: Normal appearance. She is well-developed.   Neck:      Thyroid: No thyromegaly.   Cardiovascular:      Rate and Rhythm: Normal rate and regular rhythm.      Pulses:           Posterior tibial pulses are 2+ on the right side and 2+ on the left side.      Heart " sounds: Normal heart sounds.   Pulmonary:      Effort: Pulmonary effort is normal.      Breath sounds: Normal breath sounds.   Musculoskeletal:      Right lower leg: No edema.      Left lower leg: No edema.   Skin:     General: Skin is warm and dry.   Neurological:      General: No focal deficit present.      Mental Status: She is alert.   Psychiatric:         Attention and Perception: Attention normal.         Mood and Affect: Mood and affect normal.         Behavior: Behavior normal.         Current Outpatient Medications:     hydroCHLOROthiazide (HYDRODIURIL) 25 MG tablet, Take 1 tablet by mouth Daily., Disp: 90 tablet, Rfl: 1    PARoxetine (PAXIL) 20 MG tablet, Take 0.5 tablets by mouth Daily., Disp: 45 tablet, Rfl: 1    vitamin D3 125 MCG (5000 UT) capsule capsule, Take 2 capsules by mouth Daily., Disp: , Rfl:    Past Medical History:   Diagnosis Date    Wrist fracture, right 06/2021     No Known Allergies        Result Review :                          Assessment and Plan    Diagnoses and all orders for this visit:    1. Essential hypertension (Primary)  Assessment & Plan:  Monitor blood pressure at home and report any elevated readings.  Goal is for blood pressure be 130/85 or lower.  Further treatment pending lab results.  Follow-up in 6 months or sooner if concerns.    Orders:  -     Comprehensive metabolic panel; Future  -     Lipid panel; Future  -     hydroCHLOROthiazide (HYDRODIURIL) 25 MG tablet; Take 1 tablet by mouth Daily.  Dispense: 90 tablet; Refill: 1    2. Screening for viral disease  -     Hepatitis C antibody; Future    3. Anxiety  -     PARoxetine (PAXIL) 20 MG tablet; Take 0.5 tablets by mouth Daily.  Dispense: 45 tablet; Refill: 1        Follow Up    Return in about 6 months (around 1/25/2024).  Patient was given instructions and counseling regarding her condition or for health maintenance advice. Please see specific information pulled into the AVS if appropriate.

## 2023-08-04 ENCOUNTER — TELEPHONE (OUTPATIENT)
Dept: FAMILY MEDICINE CLINIC | Age: 63
End: 2023-08-04
Payer: COMMERCIAL

## 2023-08-22 ENCOUNTER — LAB (OUTPATIENT)
Dept: LAB | Facility: HOSPITAL | Age: 63
End: 2023-08-22
Payer: COMMERCIAL

## 2023-08-22 DIAGNOSIS — Z11.59 SCREENING FOR VIRAL DISEASE: ICD-10-CM

## 2023-08-22 DIAGNOSIS — I10 ESSENTIAL HYPERTENSION: ICD-10-CM

## 2023-08-22 LAB
ALBUMIN SERPL-MCNC: 4.1 G/DL (ref 3.5–5.2)
ALBUMIN/GLOB SERPL: 1.2 G/DL
ALP SERPL-CCNC: 83 U/L (ref 39–117)
ALT SERPL W P-5'-P-CCNC: 10 U/L (ref 1–33)
ANION GAP SERPL CALCULATED.3IONS-SCNC: 9.2 MMOL/L (ref 5–15)
AST SERPL-CCNC: 15 U/L (ref 1–32)
BILIRUB SERPL-MCNC: 0.4 MG/DL (ref 0–1.2)
BUN SERPL-MCNC: 13 MG/DL (ref 8–23)
BUN/CREAT SERPL: 14.8 (ref 7–25)
CALCIUM SPEC-SCNC: 9.8 MG/DL (ref 8.6–10.5)
CHLORIDE SERPL-SCNC: 102 MMOL/L (ref 98–107)
CHOLEST SERPL-MCNC: 216 MG/DL (ref 0–200)
CO2 SERPL-SCNC: 25.8 MMOL/L (ref 22–29)
CREAT SERPL-MCNC: 0.88 MG/DL (ref 0.57–1)
EGFRCR SERPLBLD CKD-EPI 2021: 73.5 ML/MIN/1.73
GLOBULIN UR ELPH-MCNC: 3.4 GM/DL
GLUCOSE SERPL-MCNC: 88 MG/DL (ref 65–99)
HCV AB SER DONR QL: NORMAL
HDLC SERPL-MCNC: 59 MG/DL (ref 40–60)
LDLC SERPL CALC-MCNC: 137 MG/DL (ref 0–100)
LDLC/HDLC SERPL: 2.28 {RATIO}
POTASSIUM SERPL-SCNC: 4.2 MMOL/L (ref 3.5–5.2)
PROT SERPL-MCNC: 7.5 G/DL (ref 6–8.5)
SODIUM SERPL-SCNC: 137 MMOL/L (ref 136–145)
TRIGL SERPL-MCNC: 112 MG/DL (ref 0–150)
VLDLC SERPL-MCNC: 20 MG/DL (ref 5–40)

## 2023-08-22 PROCEDURE — 80053 COMPREHEN METABOLIC PANEL: CPT

## 2023-08-22 PROCEDURE — 86803 HEPATITIS C AB TEST: CPT

## 2023-08-22 PROCEDURE — 80061 LIPID PANEL: CPT

## 2023-08-22 PROCEDURE — 36415 COLL VENOUS BLD VENIPUNCTURE: CPT

## 2023-08-28 NOTE — PROGRESS NOTES
Cholesterol is a little more elevated than it was 1 year ago.  Decrease intake of saturated fat and cholesterol and increase exercise.  Never had hepatitis C.  Blood sugar, kidney, and liver function are normal.  Consider referral to dietitian regarding elevated cholesterol levels.

## 2023-11-14 ENCOUNTER — TRANSCRIBE ORDERS (OUTPATIENT)
Dept: ADMINISTRATIVE | Facility: HOSPITAL | Age: 63
End: 2023-11-14
Payer: COMMERCIAL

## 2023-11-14 DIAGNOSIS — Z12.31 VISIT FOR SCREENING MAMMOGRAM: Primary | ICD-10-CM

## 2023-11-21 ENCOUNTER — HOSPITAL ENCOUNTER (OUTPATIENT)
Dept: MAMMOGRAPHY | Facility: HOSPITAL | Age: 63
Discharge: HOME | End: 2023-11-21
Admitting: NURSE PRACTITIONER
Payer: COMMERCIAL

## 2023-11-21 DIAGNOSIS — Z12.31 VISIT FOR SCREENING MAMMOGRAM: ICD-10-CM

## 2023-11-21 PROCEDURE — 77067 SCR MAMMO BI INCL CAD: CPT

## 2023-11-21 PROCEDURE — 77063 BREAST TOMOSYNTHESIS BI: CPT

## 2023-11-21 NOTE — PROGRESS NOTES
Your recent mammogram is negative/normal.  Fibroglandular density is not uncommon.  Continue monthly self breast exams and report any changes.

## 2024-02-12 ENCOUNTER — TELEPHONE (OUTPATIENT)
Dept: FAMILY MEDICINE CLINIC | Age: 64
End: 2024-02-12
Payer: COMMERCIAL

## 2024-02-12 DIAGNOSIS — F41.9 ANXIETY: ICD-10-CM

## 2024-02-12 RX ORDER — PAROXETINE HYDROCHLORIDE 20 MG/1
10 TABLET, FILM COATED ORAL DAILY
Qty: 45 TABLET | Refills: 0 | Status: SHIPPED | OUTPATIENT
Start: 2024-02-12

## 2024-02-20 ENCOUNTER — OFFICE VISIT (OUTPATIENT)
Dept: FAMILY MEDICINE CLINIC | Age: 64
End: 2024-02-20
Payer: COMMERCIAL

## 2024-02-20 VITALS
HEART RATE: 86 BPM | SYSTOLIC BLOOD PRESSURE: 138 MMHG | OXYGEN SATURATION: 97 % | BODY MASS INDEX: 30.93 KG/M2 | DIASTOLIC BLOOD PRESSURE: 90 MMHG | HEIGHT: 61 IN | TEMPERATURE: 98 F | WEIGHT: 163.8 LBS

## 2024-02-20 DIAGNOSIS — G47.00 INSOMNIA, UNSPECIFIED TYPE: Primary | ICD-10-CM

## 2024-02-20 DIAGNOSIS — F41.9 ANXIETY: ICD-10-CM

## 2024-02-20 DIAGNOSIS — I10 ESSENTIAL HYPERTENSION: ICD-10-CM

## 2024-02-20 PROCEDURE — 99213 OFFICE O/P EST LOW 20 MIN: CPT | Performed by: NURSE PRACTITIONER

## 2024-02-20 RX ORDER — HYDROCHLOROTHIAZIDE 25 MG/1
25 TABLET ORAL DAILY
Qty: 90 TABLET | Refills: 1 | Status: SHIPPED | OUTPATIENT
Start: 2024-02-20

## 2024-02-20 NOTE — PROGRESS NOTES
"Chief Complaint  Hypertension (6 mo. F/U ) and Anxiety    Subjective          Martha S Sturgeon presents to [facility]     Patient is a 63-year-old female who is here today to follow-up regarding hypertension.  Patient feels that blood pressures under good control on hydrochlorothiazide 25 mg daily.  Denies side effects and requests refills.  Does not routinely monitor blood pressure.    Insomnia stable with use of Tylenol PM.  Trazodone and hydroxyzine were previously ineffective.  Last labs showed normal kidney and liver function.  Patient defers labs until August.    Anxiety stable on paroxetine one half of a 20 mg tablet daily.  Denies medication side effects and requests refills.         Objective   Vital Signs:   Vitals:    02/20/24 1527 02/20/24 1539 02/20/24 1632   BP: 175/92 (!) 183/91 138/90   BP Location: Left arm Right arm Right arm   Patient Position: Sitting Sitting Sitting   Cuff Size: Adult Adult Large Adult   Pulse: 86 86    Temp: 98 °F (36.7 °C)     TempSrc: Oral     SpO2: 97%     Weight: 74.3 kg (163 lb 12.8 oz)     Height: 154.3 cm (60.75\")         Wt Readings from Last 3 Encounters:   02/20/24 74.3 kg (163 lb 12.8 oz)   07/25/23 73 kg (161 lb)   01/24/23 74.8 kg (165 lb)      BP Readings from Last 3 Encounters:   02/20/24 138/90   07/25/23 140/90   01/24/23 136/74       Body mass index is 31.2 kg/m².           Physical Exam  Vitals reviewed.   Constitutional:       General: She is not in acute distress.     Appearance: Normal appearance. She is well-developed.   Neck:      Thyroid: No thyromegaly.      Vascular: No carotid bruit.   Cardiovascular:      Rate and Rhythm: Normal rate and regular rhythm.      Pulses:           Posterior tibial pulses are 2+ on the right side and 2+ on the left side.      Heart sounds: Normal heart sounds.   Pulmonary:      Effort: Pulmonary effort is normal.      Breath sounds: Normal breath sounds.   Musculoskeletal:      Right " lower leg: No edema.      Left lower leg: No edema.   Skin:     General: Skin is warm and dry.   Neurological:      General: No focal deficit present.      Mental Status: She is alert.   Psychiatric:         Attention and Perception: Attention normal.         Mood and Affect: Mood and affect normal.         Behavior: Behavior normal.           Current Outpatient Medications:     hydroCHLOROthiazide 25 MG tablet, Take 1 tablet by mouth Daily., Disp: 90 tablet, Rfl: 1    PARoxetine (PAXIL) 20 MG tablet, Take 0.5 tablets by mouth Daily., Disp: 45 tablet, Rfl: 0    vitamin D3 125 MCG (5000 UT) capsule capsule, Take 2 capsules by mouth Daily., Disp: , Rfl:    Past Medical History:   Diagnosis Date    Wrist fracture, right 06/2021     No Known Allergies            Result Review :     Common labs          8/22/2023    10:14   Common Labs   Glucose 88    BUN 13    Creatinine 0.88    Sodium 137    Potassium 4.2    Chloride 102    Calcium 9.8    Albumin 4.1    Total Bilirubin 0.4    Alkaline Phosphatase 83    AST (SGOT) 15    ALT (SGPT) 10    Total Cholesterol 216    Triglycerides 112    HDL Cholesterol 59    LDL Cholesterol  137         Mammo Screening Digital Tomosynthesis Bilateral With CAD    Result Date: 11/21/2023  Benign mammogram. Suggest routine mammographic screening.  RECOMMENDATION(S):  ROUTINE MAMMOGRAM AND CLINICAL EVALUATION IN 12 MONTHS.   BIRADS:  DIAGNOSTIC CATEGORY 1--NEGATIVE.   BREAST COMPOSITION: Scattered areas fibroglandular density.  PLEASE NOTE:  A NORMAL MAMMOGRAM DOES NOT EXCLUDE THE POSSIBILITY OF BREAST CANCER. ANY CLINICALLY SUSPICIOUS PALPABLE LUMP SHOULD BE BIOPSIED.      TOBY CEVALLOS MD       Electronically Signed and Approved By: TOBY CEVALLOS MD on 11/21/2023 at 11:24                      Social History     Tobacco Use   Smoking Status Every Day    Packs/day: 0.50    Years: 49.00    Additional pack years: 0.00    Total pack years: 24.50    Types: Cigarettes    Start date: 1974    Smokeless Tobacco Never           Assessment and Plan    Diagnoses and all orders for this visit:    1. Insomnia, unspecified type (Primary)  Assessment & Plan:  Stable.  Prefers over-the-counter treatment at this time.      2. Anxiety  Assessment & Plan:  No refill needed today.  Will be on Medicare insurance in 1 month.  Symptoms stable.  Will in the future need to convert to mail order pharmacy.  Patient to provide that information once it is available      3. Essential hypertension  Assessment & Plan:  Goal is for blood pressure to be 130/85 or lower.  Patient to get blood pressure cuff and monitor blood pressure at home and report any elevated readings.  Follow-up in 6 months or sooner if concerns.    Orders:  -     Comprehensive metabolic panel; Future  -     Lipid panel; Future  -     hydroCHLOROthiazide 25 MG tablet; Take 1 tablet by mouth Daily.  Dispense: 90 tablet; Refill: 1        Follow Up    No follow-ups on file.  Patient was given instructions and counseling regarding her condition or for health maintenance advice. Please see specific information pulled into the AVS if appropriate.

## 2024-02-20 NOTE — ASSESSMENT & PLAN NOTE
Goal is for blood pressure to be 130/85 or lower.  Patient to get blood pressure cuff and monitor blood pressure at home and report any elevated readings.  Follow-up in 6 months or sooner if concerns.

## 2024-02-20 NOTE — ASSESSMENT & PLAN NOTE
No refill needed today.  Will be on Medicare insurance in 1 month.  Symptoms stable.  Will in the future need to convert to mail order pharmacy.  Patient to provide that information once it is available

## 2024-04-22 DIAGNOSIS — I10 ESSENTIAL HYPERTENSION: ICD-10-CM

## 2024-04-22 DIAGNOSIS — F41.9 ANXIETY: ICD-10-CM

## 2024-04-22 RX ORDER — HYDROCHLOROTHIAZIDE 25 MG/1
25 TABLET ORAL DAILY
Qty: 90 TABLET | Refills: 1 | Status: SHIPPED | OUTPATIENT
Start: 2024-04-22

## 2024-04-22 RX ORDER — PAROXETINE HYDROCHLORIDE 20 MG/1
10 TABLET, FILM COATED ORAL DAILY
Qty: 45 TABLET | Refills: 0 | Status: SHIPPED | OUTPATIENT
Start: 2024-04-22

## 2024-04-22 NOTE — TELEPHONE ENCOUNTER
Caller: Sturgeon, Martha S    Relationship: Self    Best call back number: [phone]     Requested Prescriptions:   Requested Prescriptions     Pending Prescriptions Disp Refills    hydroCHLOROthiazide 25 MG tablet 90 tablet 1     Sig: Take 1 tablet by mouth Daily.    PARoxetine (PAXIL) 20 MG tablet 45 tablet 0     Sig: Take 0.5 tablets by mouth Daily.        Pharmacy where request should be sent: [facility] [address] [phone] [facility] [phone]      Last office visit with prescribing clinician: 2/20/2024   Last telemedicine visit with prescribing clinician: Visit date not found   Next office visit with prescribing clinician: 8/6/2024     Does the patient have less than a 3 day supply:  [] Yes  [] No    Would you like a call back once the refill request has been completed: [x] Yes [] No    If the office needs to give you a call back, can they leave a voicemail: [x] Yes [] No    Carlee Oshea Rep   04/22/24 12:59 EDT

## 2024-07-08 DIAGNOSIS — F41.9 ANXIETY: ICD-10-CM

## 2024-07-08 RX ORDER — PAROXETINE HYDROCHLORIDE 20 MG/1
10 TABLET, FILM COATED ORAL DAILY
Qty: 45 TABLET | Refills: 0 | Status: SHIPPED | OUTPATIENT
Start: 2024-07-08

## 2024-07-08 NOTE — TELEPHONE ENCOUNTER
Caller: chelaon Melanie S    Relationship: Self    Best call back number: [phone]     Requested Prescriptions:   Requested Prescriptions     Pending Prescriptions Disp Refills    PARoxetine (PAXIL) 20 MG tablet 45 tablet 0     Sig: Take 0.5 tablets by mouth Daily.        Pharmacy where request should be sent: [facility] - [address] [phone] [facility] [phone]      Last office visit with prescribing clinician: 2/20/2024   Last telemedicine visit with prescribing clinician: Visit date not found   Next office visit with prescribing clinician: 8/6/2024     Additional details provided by patient: PATIENT WOULD LIKE REFILLS ADDED IF POSSIBLE//  PATIENT IS CALLING IN EARLY FOR REFILL FOR MAIL DELIVERY    Does the patient have less than a 3 day supply:  [] Yes  [x] No        Carlee Porras Rep   07/08/24 13:20 EDT

## 2024-08-06 ENCOUNTER — LAB (OUTPATIENT)
Dept: LAB | Facility: HOSPITAL | Age: 64
End: 2024-08-06
Payer: MEDICARE

## 2024-08-06 ENCOUNTER — OFFICE VISIT (OUTPATIENT)
Dept: FAMILY MEDICINE CLINIC | Age: 64
End: 2024-08-06
Payer: MEDICARE

## 2024-08-06 VITALS
DIASTOLIC BLOOD PRESSURE: 90 MMHG | SYSTOLIC BLOOD PRESSURE: 134 MMHG | BODY MASS INDEX: 31.75 KG/M2 | HEART RATE: 79 BPM | TEMPERATURE: 98 F | OXYGEN SATURATION: 95 % | WEIGHT: 168.2 LBS | HEIGHT: 61 IN

## 2024-08-06 DIAGNOSIS — I10 ESSENTIAL HYPERTENSION: ICD-10-CM

## 2024-08-06 DIAGNOSIS — F41.9 ANXIETY: ICD-10-CM

## 2024-08-06 DIAGNOSIS — G47.00 INSOMNIA, UNSPECIFIED TYPE: Primary | ICD-10-CM

## 2024-08-06 LAB
ALBUMIN SERPL-MCNC: 4.2 G/DL (ref 3.5–5.2)
ALBUMIN/GLOB SERPL: 1.2 G/DL
ALP SERPL-CCNC: 96 U/L (ref 39–117)
ALT SERPL W P-5'-P-CCNC: 10 U/L (ref 1–33)
ANION GAP SERPL CALCULATED.3IONS-SCNC: 10.4 MMOL/L (ref 5–15)
AST SERPL-CCNC: 15 U/L (ref 1–32)
BILIRUB SERPL-MCNC: 0.2 MG/DL (ref 0–1.2)
BUN SERPL-MCNC: 11 MG/DL (ref 8–23)
BUN/CREAT SERPL: 14.3 (ref 7–25)
CALCIUM SPEC-SCNC: 9.7 MG/DL (ref 8.6–10.5)
CHLORIDE SERPL-SCNC: 101 MMOL/L (ref 98–107)
CHOLEST SERPL-MCNC: 219 MG/DL (ref 0–200)
CO2 SERPL-SCNC: 27.6 MMOL/L (ref 22–29)
CREAT SERPL-MCNC: 0.77 MG/DL (ref 0.57–1)
EGFRCR SERPLBLD CKD-EPI 2021: 85.7 ML/MIN/1.73
GLOBULIN UR ELPH-MCNC: 3.5 GM/DL
GLUCOSE SERPL-MCNC: 88 MG/DL (ref 65–99)
HDLC SERPL-MCNC: 65 MG/DL (ref 40–60)
LDLC SERPL CALC-MCNC: 136 MG/DL (ref 0–100)
LDLC/HDLC SERPL: 2.06 {RATIO}
POTASSIUM SERPL-SCNC: 3.8 MMOL/L (ref 3.5–5.2)
PROT SERPL-MCNC: 7.7 G/DL (ref 6–8.5)
SODIUM SERPL-SCNC: 139 MMOL/L (ref 136–145)
TRIGL SERPL-MCNC: 100 MG/DL (ref 0–150)
VLDLC SERPL-MCNC: 18 MG/DL (ref 5–40)

## 2024-08-06 PROCEDURE — 1160F RVW MEDS BY RX/DR IN RCRD: CPT | Performed by: NURSE PRACTITIONER

## 2024-08-06 PROCEDURE — 3075F SYST BP GE 130 - 139MM HG: CPT | Performed by: NURSE PRACTITIONER

## 2024-08-06 PROCEDURE — 80053 COMPREHEN METABOLIC PANEL: CPT

## 2024-08-06 PROCEDURE — 80061 LIPID PANEL: CPT

## 2024-08-06 PROCEDURE — 99214 OFFICE O/P EST MOD 30 MIN: CPT | Performed by: NURSE PRACTITIONER

## 2024-08-06 PROCEDURE — 3080F DIAST BP >= 90 MM HG: CPT | Performed by: NURSE PRACTITIONER

## 2024-08-06 PROCEDURE — 36415 COLL VENOUS BLD VENIPUNCTURE: CPT

## 2024-08-06 PROCEDURE — 1159F MED LIST DOCD IN RCRD: CPT | Performed by: NURSE PRACTITIONER

## 2024-08-06 RX ORDER — HYDROCHLOROTHIAZIDE 25 MG/1
25 TABLET ORAL DAILY
Qty: 90 TABLET | Refills: 1 | Status: SHIPPED | OUTPATIENT
Start: 2024-08-06

## 2024-08-06 RX ORDER — PAROXETINE HYDROCHLORIDE 20 MG/1
10 TABLET, FILM COATED ORAL DAILY
Qty: 45 TABLET | Refills: 1 | Status: SHIPPED | OUTPATIENT
Start: 2024-08-06

## 2024-08-06 NOTE — ASSESSMENT & PLAN NOTE
Symptoms stable.  There are ready pending lab orders in the chart and patient will have those collected today.  Monitor blood pressure at home and report any elevated readings.  Follow-up in 6 months or sooner if concerns.

## 2024-08-06 NOTE — PROGRESS NOTES
"Chief Complaint  Annual Exam    Subjective          Martha S Sturgeon presents to [facility]     Patient is a 64-year-old female who is here today for follow-up regarding hypertension.  Current treatment is hydrochlorothiazide 25 mg daily.  Denies side effects and requests refills.    For insomnia takes Tylenol PM.  Hydroxyzine and trazodone were previously ineffective.  Symptoms are stable to improved.    For anxiety she takes paroxetine one half of a 20 mg tablet daily.  Denies side effects and requests refills.    Patient could kit mammogram done after late November.  Patient states she will discuss mammogram and breast cancer screening at her next appointment in February.     Objective   Vital Signs:   Vitals:    08/06/24 0909 08/06/24 1011   BP: 173/92 134/90   BP Location:  Right arm   Patient Position:  Sitting   Cuff Size:  Adult   Pulse: 79    Temp: 98 °F (36.7 °C)    TempSrc: Oral    SpO2: 95%    Weight: 76.3 kg (168 lb 3.2 oz)    Height: 154.3 cm (60.75\")        Wt Readings from Last 3 Encounters:   08/06/24 76.3 kg (168 lb 3.2 oz)   02/20/24 74.3 kg (163 lb 12.8 oz)   07/25/23 73 kg (161 lb)      BP Readings from Last 3 Encounters:   08/06/24 134/90   02/20/24 138/90   07/25/23 140/90       Body mass index is 32.04 kg/m².           Physical Exam  Vitals reviewed.   Constitutional:       General: She is not in acute distress.     Appearance: Normal appearance. She is well-developed.   Neck:      Thyroid: No thyromegaly.      Vascular: No carotid bruit.   Cardiovascular:      Rate and Rhythm: Normal rate and regular rhythm.      Heart sounds: Normal heart sounds.   Pulmonary:      Effort: Pulmonary effort is normal.      Breath sounds: Normal breath sounds.   Musculoskeletal:      Right lower leg: No edema.      Left lower leg: No edema.   Skin:     General: Skin is warm and dry.   Neurological:      General: No focal deficit present.      Mental Status: She is alert. "   Psychiatric:         Attention and Perception: Attention normal.         Mood and Affect: Mood and affect normal.         Behavior: Behavior normal.           Current Outpatient Medications:     hydroCHLOROthiazide 25 MG tablet, Take 1 tablet by mouth Daily., Disp: 90 tablet, Rfl: 1    PARoxetine (PAXIL) 20 MG tablet, Take 0.5 tablets by mouth Daily., Disp: 45 tablet, Rfl: 1    vitamin D3 125 MCG (5000 UT) capsule capsule, Take 2 capsules by mouth Daily., Disp: , Rfl:    Past Medical History:   Diagnosis Date    Wrist fracture, right 06/2021     No Known Allergies            Result Review :     Common labs          8/22/2023    10:14   Common Labs   Glucose 88    BUN 13    Creatinine 0.88    Sodium 137    Potassium 4.2    Chloride 102    Calcium 9.8    Albumin 4.1    Total Bilirubin 0.4    Alkaline Phosphatase 83    AST (SGOT) 15    ALT (SGPT) 10    Total Cholesterol 216    Triglycerides 112    HDL Cholesterol 59    LDL Cholesterol  137         No Images in the past 120 days found..           Social History     Tobacco Use   Smoking Status Every Day    Current packs/day: 0.50    Average packs/day: 0.5 packs/day for 50.6 years (25.3 ttl pk-yrs)    Types: Cigarettes    Start date: 1974    Passive exposure: Current   Smokeless Tobacco Never           Assessment and Plan    Diagnoses and all orders for this visit:    1. Insomnia, unspecified type (Primary)  Assessment & Plan:  Symptoms stable.  There are ready pending lab orders in the chart and patient will have those collected today.  Monitor blood pressure at home and report any elevated readings.  Follow-up in 6 months or sooner if concerns.      2. Anxiety  -     PARoxetine (PAXIL) 20 MG tablet; Take 0.5 tablets by mouth Daily.  Dispense: 45 tablet; Refill: 1    3. Essential hypertension  -     hydroCHLOROthiazide 25 MG tablet; Take 1 tablet by mouth Daily.  Dispense: 90 tablet; Refill: 1        Follow Up    Return in about 6 months (around 2/6/2025).  Patient  was given instructions and counseling regarding her condition or for health maintenance advice. Please see specific information pulled into the AVS if appropriate.

## 2024-08-07 NOTE — PROGRESS NOTES
Sugar, kidney, and liver function are normal.  Cholesterol remains mildly elevated.  Please let me know if you would like to implement any other interventions to help lower cholesterol levels.

## 2025-02-25 ENCOUNTER — OFFICE VISIT (OUTPATIENT)
Dept: FAMILY MEDICINE CLINIC | Age: 65
End: 2025-02-25
Payer: MEDICARE

## 2025-02-25 VITALS
BODY MASS INDEX: 33.27 KG/M2 | WEIGHT: 176.2 LBS | OXYGEN SATURATION: 96 % | TEMPERATURE: 97.9 F | SYSTOLIC BLOOD PRESSURE: 134 MMHG | HEART RATE: 91 BPM | DIASTOLIC BLOOD PRESSURE: 86 MMHG | HEIGHT: 61 IN

## 2025-02-25 DIAGNOSIS — Z53.20 COLON CANCER SCREENING DECLINED: ICD-10-CM

## 2025-02-25 DIAGNOSIS — H61.21 IMPACTED CERUMEN OF RIGHT EAR: Primary | ICD-10-CM

## 2025-02-25 DIAGNOSIS — F41.9 ANXIETY: ICD-10-CM

## 2025-02-25 DIAGNOSIS — Z00.00 MEDICARE ANNUAL WELLNESS VISIT, INITIAL: ICD-10-CM

## 2025-02-25 DIAGNOSIS — F17.210 CIGARETTE NICOTINE DEPENDENCE WITHOUT COMPLICATION: ICD-10-CM

## 2025-02-25 DIAGNOSIS — I10 ESSENTIAL HYPERTENSION: ICD-10-CM

## 2025-02-25 DIAGNOSIS — Z12.2 SCREENING FOR LUNG CANCER: ICD-10-CM

## 2025-02-25 DIAGNOSIS — Z12.31 BREAST CANCER SCREENING BY MAMMOGRAM: ICD-10-CM

## 2025-02-25 DIAGNOSIS — R63.2 APPETITE INCREASE: ICD-10-CM

## 2025-02-25 DIAGNOSIS — E89.40 ASYMPTOMATIC POSTSURGICAL MENOPAUSE: ICD-10-CM

## 2025-02-25 DIAGNOSIS — E55.9 VITAMIN D DEFICIENCY: ICD-10-CM

## 2025-02-25 RX ORDER — PAROXETINE 20 MG/1
10 TABLET, FILM COATED ORAL DAILY
Qty: 45 TABLET | Refills: 1 | Status: SHIPPED | OUTPATIENT
Start: 2025-02-25

## 2025-02-25 RX ORDER — HYDROCHLOROTHIAZIDE 25 MG/1
25 TABLET ORAL DAILY
Qty: 90 TABLET | Refills: 1 | Status: SHIPPED | OUTPATIENT
Start: 2025-02-25

## 2025-02-25 NOTE — PROGRESS NOTES
Subjective   The ABCs of the Annual Wellness Visit  Medicare Wellness Visit      Martha S Sturgeon is a 64 y.o. patient who presents for a Medicare Wellness Visit.    Family history is negative for colon cancer or colon polyps being removed.  Patient is advised on Cologuard versus colonoscopy as a means of colon cancer screening.  Patient defers screening at this time.  Will consider for future.    Last eye exam 3 months ago.  Gets regular dental cleanings.  Would like to get screening for lung, breast cancer and osteoporosis.  Patient is concerned she has more increased cravings for sweets and would like to have her cortisol level checked.  Also with history of vitamin D deficiency currently on a supplement.  Is due to have vitamin D level rechecked.    Guarding hyperlipidemia patient would prefer to implement lifestyle measures before starting a statin medication.  We will continue to monitor fasting labs for reevaluation.    Patient states anxiety stable on paroxetine 10 mg daily.  Denies side effects and requests refills.    For hypertension she takes hydrochlorothiazide 25 mg daily.  Denies side effects and requests refills.      The following portions of the patient's history were reviewed and   updated as appropriate: allergies, current medications, past family history, past medical history, past social history, past surgical history, and problem list.    Compared to one year ago, the patient's physical   health is the same.  Compared to one year ago, the patient's mental   health is the same.    Recent Hospitalizations:  She was not admitted to the hospital during the last year.     Current Medical Providers:  Patient Care Team:  Melina Pugh APRN as PCP - General (Nurse Practitioner)    Outpatient Medications Prior to Visit   Medication Sig Dispense Refill    vitamin D3 125 MCG (5000 UT) capsule capsule Take 2 capsules by mouth Daily.      hydroCHLOROthiazide 25 MG tablet Take 1 tablet by mouth Daily.  "90 tablet 1    PARoxetine (PAXIL) 20 MG tablet Take 0.5 tablets by mouth Daily. 45 tablet 1     No facility-administered medications prior to visit.     No opioid medication identified on active medication list. I have reviewed chart for other potential  high risk medication/s and harmful drug interactions in the elderly.      Aspirin is not on active medication list.  Aspirin use is not indicated based on review of current medical condition/s. Risk of harm outweighs potential benefits.  .    Patient Active Problem List   Diagnosis    Breast cancer screening by mammogram    Essential hypertension    Anxiety    Insomnia    Eustachian tube dysfunction, bilateral    History of hyperthyroidism    Screening for viral disease    Impacted cerumen of right ear    Medicare annual wellness visit, initial    Asymptomatic postsurgical menopause    Colon cancer screening declined    Screening for lung cancer    Cigarette nicotine dependence without complication     Advance Care Planning Advance Directive is not on file.  ACP discussion was held with the patient during this visit. Patient does not have an advance directive, declines further assistance.            Objective   Vitals:    02/25/25 1416 02/25/25 1430   BP: 173/89 134/86   BP Location: Right arm Left arm   Patient Position: Sitting Sitting   Cuff Size:  Large Adult   Pulse: 91    Temp: 97.9 °F (36.6 °C)    TempSrc: Temporal    SpO2: 96%    Weight: 79.9 kg (176 lb 3.2 oz)    Height: 154.3 cm (60.75\")    PainSc: 0-No pain        Estimated body mass index is 33.57 kg/m² as calculated from the following:    Height as of this encounter: 154.3 cm (60.75\").    Weight as of this encounter: 79.9 kg (176 lb 3.2 oz).    BMI is >= 30 and <35. (Class 1 Obesity). The following options were offered after discussion;: information on healthy weight added to patient's after visit summary            Gait and Balance Evaluation:  Normal    Does the patient have evidence of cognitive " impairment? No                                                                                                Health  Risk Assessment    Smoking Status:  Social History     Tobacco Use   Smoking Status Every Day    Current packs/day: 0.50    Average packs/day: 0.5 packs/day for 51.2 years (25.6 ttl pk-yrs)    Types: Cigarettes    Start date:     Passive exposure: Current   Smokeless Tobacco Never     Alcohol Consumption:  Social History     Substance and Sexual Activity   Alcohol Use Not Currently       Fall Risk Screen  SAMY Fall Risk Assessment was completed, and patient is at LOW risk for falls.Assessment completed on:2025    Depression Screening   Little interest or pleasure in doing things? Not at all   Feeling down, depressed, or hopeless? Not at all   PHQ-2 Total Score 0      Health Habits and Functional and Cognitive Screenin/25/2025     2:21 PM   Functional & Cognitive Status   Do you have difficulty preparing food and eating? No   Do you have difficulty bathing yourself, getting dressed or grooming yourself? No   Do you have difficulty using the toilet? No   Do you have difficulty moving around from place to place? No   Do you have trouble with steps or getting out of a bed or a chair? No   Current Diet Well Balanced Diet   Dental Exam Up to date   Eye Exam Up to date   Exercise (times per week) 0 times per week   Current Exercises Include No Regular Exercise   Do you need help using the phone?  No   Are you deaf or do you have serious difficulty hearing?  No   Do you need help to go to places out of walking distance? No   Do you need help shopping? No   Do you need help preparing meals?  No   Do you need help with housework?  No   Do you need help with laundry? No   Do you need help taking your medications? No   Do you need help managing money? No   Do you ever drive or ride in a car without wearing a seat belt? No   Have you felt unusual stress, anger or loneliness in the last month?  No   Who do you live with? Spouse   If you need help, do you have trouble finding someone available to you? No   Have you been bothered in the last four weeks by sexual problems? No   Do you have difficulty concentrating, remembering or making decisions? No           Visual Acuity:  Vision Screening    Right eye Left eye Both eyes   Without correction      With correction 20/25 20/30 20/25     Age-appropriate Screening Schedule:  Refer to the list below for future screening recommendations based on patient's age, sex and/or medical conditions. Orders for these recommended tests are listed in the plan section. The patient has been provided with a written plan.    Health Maintenance List  Health Maintenance   Topic Date Due    DXA SCAN  Never done    COLORECTAL CANCER SCREENING  Never done    ZOSTER VACCINE (1 of 2) Never done    LUNG CANCER SCREENING  Never done    BMI FOLLOWUP  07/26/2023    COVID-19 Vaccine (1 - 2024-25 season) Never done    INFLUENZA VACCINE  03/31/2025 (Originally 7/1/2024)    Pneumococcal Vaccine 50+ (1 of 2 - PCV) 08/06/2025 (Originally [birth year]/7/1978)    TDAP/TD VACCINES (1 - Tdap) 08/06/2025 (Originally [birth year]/7/1979)    MAMMOGRAM  11/21/2025    ANNUAL WELLNESS VISIT  02/25/2026    HEPATITIS C SCREENING  Completed                                                                                                                                                CMS Preventative Services Quick Reference  Risk Factors Identified During Encounter  Immunizations Discussed/Encouraged: Shingrix and COVID19  Dental Screening Recommended  Vision Screening Recommended    The above risks/problems have been discussed with the patient.  Pertinent information has been shared with the patient in the After Visit Summary.  An After Visit Summary and PPPS were made available to the patient.    Follow Up:   Next Medicare Wellness visit to be scheduled in 1 year.         Additional E&M Note during same encounter  "follows:  Patient has additional, significant, and separately identifiable condition(s)/problem(s) that require work above and beyond the Medicare Wellness Visit     Chief Complaint  Welcome To Medicare, Hypertension, and Anxiety    Subjective   HPI  Mealnie is also being seen today for an annual adult preventative physical exam.                 Objective   Vital Signs:  /86 (BP Location: Left arm, Patient Position: Sitting, Cuff Size: Large Adult)   Pulse 91   Temp 97.9 °F (36.6 °C) (Temporal)   Ht 154.3 cm (60.75\")   Wt 79.9 kg (176 lb 3.2 oz)   SpO2 96%   BMI 33.57 kg/m²   Physical Exam  Vitals reviewed.   Constitutional:       General: She is not in acute distress.     Appearance: Normal appearance. She is well-developed. She is obese.   HENT:      Right Ear: There is impacted cerumen.      Left Ear: Tympanic membrane normal.      Mouth/Throat:      Pharynx: No oropharyngeal exudate or posterior oropharyngeal erythema.   Neck:      Thyroid: No thyromegaly.      Vascular: No carotid bruit.   Cardiovascular:      Rate and Rhythm: Normal rate and regular rhythm.      Pulses:           Posterior tibial pulses are 2+ on the right side and 2+ on the left side.      Heart sounds: Normal heart sounds.   Pulmonary:      Effort: Pulmonary effort is normal.      Breath sounds: Normal breath sounds.   Abdominal:      General: Abdomen is flat. Bowel sounds are normal. There is no distension.      Palpations: Abdomen is soft. There is no mass.      Tenderness: There is no abdominal tenderness. There is no guarding or rebound.   Musculoskeletal:      Right lower leg: No edema.      Left lower leg: No edema.   Skin:     General: Skin is warm and dry.   Neurological:      General: No focal deficit present.      Mental Status: She is alert.   Psychiatric:         Attention and Perception: Attention normal.         Mood and Affect: Mood and affect normal.         Behavior: Behavior normal.         The following data was " reviewed by: GINA Lazaro on 02/25/2025:  Data reviewed : labs  Common labs          8/6/2024    10:09   Common Labs   Glucose 88    BUN 11    Creatinine 0.77    Sodium 139    Potassium 3.8    Chloride 101    Calcium 9.7    Albumin 4.2    Total Bilirubin 0.2    Alkaline Phosphatase 96    AST (SGOT) 15    ALT (SGPT) 10    Total Cholesterol 219    Triglycerides 100    HDL Cholesterol 65    LDL Cholesterol  136              Assessment and Plan Additional age appropriate preventative wellness advice topics were discussed during today's preventative wellness exam(some topics already addressed during AWV portion of the note above):   Nutrition: Discussed nutrition plan with patient. Information shared in after visit summary. Goal is for a well balanced diet to enhance overall health.           Impacted cerumen of right ear    Orders:    Ear Cerumen Removal    Essential hypertension      Orders:    hydroCHLOROthiazide 25 MG tablet; Take 1 tablet by mouth Daily.    Comprehensive metabolic panel; Future    Lipid panel; Future    Anxiety    Orders:    PARoxetine (PAXIL) 20 MG tablet; Take 0.5 tablets by mouth Daily.    Medicare annual wellness visit, initial  Preventive care measures are discussed.  Further treatment recommendations pending lab results.         Asymptomatic postsurgical menopause    Orders:    DEXA Bone Density Axial    Breast cancer screening by mammogram    Orders:    Mammo Screening Digital Tomosynthesis Bilateral With CAD; Future    Screening for lung cancer    Orders:     CT Chest Low Dose Cancer Screening WO; Future    Colon cancer screening declined         Cigarette nicotine dependence without complication                                            Orders:     CT Chest Low Dose Cancer Screening WO; Future    Vitamin D deficiency    Orders:    Vitamin D 25 hydroxy; Future    Appetite increase    Orders:    TSH Rfx On Abnormal To Free T4; Future    Cortisol - AM; Future    CBC w AUTO  Differential; Future          I spent 50 minutes caring for Melanie on this date of service. This time includes time spent by me in the following activities:preparing for the visit, reviewing tests, obtaining and/or reviewing a separately obtained history, performing a medically appropriate examination and/or evaluation , counseling and educating the patient/family/caregiver, ordering medications, tests, or procedures, and documenting information in the medical record  Follow Up   No follow-ups on file.  Patient was given instructions and counseling regarding her condition or for health maintenance advice. Please see specific information pulled into the AVS if appropriate.

## 2025-02-25 NOTE — PROGRESS NOTES
Ear Cerumen Removal    Date/Time: 2/25/2025 3:30 PM    Performed by: Liz Garcia LPN  Authorized by: Melina Pugh APRN    Anesthesia:  Local Anesthetic: none  Location details: right ear  Patient tolerance: patient tolerated the procedure well with no immediate complications  Comments: Hard ball of wax removed from R ear, eardrum visible  Procedure type: irrigation   Sedation:  Patient sedated: no

## 2025-02-25 NOTE — ASSESSMENT & PLAN NOTE
Orders:     CT Chest Low Dose Cancer Screening WO; Future

## 2025-02-25 NOTE — ASSESSMENT & PLAN NOTE
Preventive care measures are discussed.  Further treatment recommendations pending lab results.

## 2025-02-25 NOTE — ASSESSMENT & PLAN NOTE
Orders:    hydroCHLOROthiazide 25 MG tablet; Take 1 tablet by mouth Daily.    Comprehensive metabolic panel; Future    Lipid panel; Future

## 2025-03-17 ENCOUNTER — TELEPHONE (OUTPATIENT)
Dept: FAMILY MEDICINE CLINIC | Age: 65
End: 2025-03-17
Payer: MEDICARE

## 2025-03-17 NOTE — TELEPHONE ENCOUNTER
----- Message from Kem ARANA sent at 3/17/2025  9:48 AM EDT -----    ----- Message -----  From: SYSTEM  Sent: 3/17/2025   1:29 AM EDT  To: geoff Becerra [facility]

## 2025-03-24 ENCOUNTER — LAB (OUTPATIENT)
Dept: LAB | Facility: HOSPITAL | Age: 65
End: 2025-03-24
Payer: MEDICARE

## 2025-03-24 DIAGNOSIS — E55.9 VITAMIN D DEFICIENCY: ICD-10-CM

## 2025-03-25 ENCOUNTER — LAB (OUTPATIENT)
Dept: LAB | Facility: HOSPITAL | Age: 65
End: 2025-03-25
Payer: MEDICARE

## 2025-03-25 DIAGNOSIS — R63.2 APPETITE INCREASE: ICD-10-CM

## 2025-03-25 DIAGNOSIS — I10 ESSENTIAL HYPERTENSION: ICD-10-CM

## 2025-03-25 LAB
25(OH)D3 SERPL-MCNC: 79 NG/ML (ref 30–100)
ALBUMIN SERPL-MCNC: 3.8 G/DL (ref 3.5–5.2)
ALBUMIN/GLOB SERPL: 1 G/DL
ALP SERPL-CCNC: 94 U/L (ref 39–117)
ALT SERPL W P-5'-P-CCNC: 10 U/L (ref 1–33)
ANION GAP SERPL CALCULATED.3IONS-SCNC: 8.7 MMOL/L (ref 5–15)
AST SERPL-CCNC: 17 U/L (ref 1–32)
BASOPHILS # BLD AUTO: 0.03 10*3/MM3 (ref 0–0.2)
BASOPHILS NFR BLD AUTO: 0.5 % (ref 0–1.5)
BILIRUB SERPL-MCNC: 0.4 MG/DL (ref 0–1.2)
BUN SERPL-MCNC: 12 MG/DL (ref 8–23)
BUN/CREAT SERPL: 10.7 (ref 7–25)
CALCIUM SPEC-SCNC: 9.9 MG/DL (ref 8.6–10.5)
CHLORIDE SERPL-SCNC: 100 MMOL/L (ref 98–107)
CHOLEST SERPL-MCNC: 214 MG/DL (ref 0–200)
CO2 SERPL-SCNC: 27.3 MMOL/L (ref 22–29)
CORTIS AM PEAK SERPL-MCNC: 17.38 MCG/DL (ref 6.02–18.4)
CREAT SERPL-MCNC: 1.12 MG/DL (ref 0.57–1)
DEPRECATED RDW RBC AUTO: 52.7 FL (ref 37–54)
EGFRCR SERPLBLD CKD-EPI 2021: 54.3 ML/MIN/1.73
EOSINOPHIL # BLD AUTO: 0.15 10*3/MM3 (ref 0–0.4)
EOSINOPHIL NFR BLD AUTO: 2.3 % (ref 0.3–6.2)
ERYTHROCYTE [DISTWIDTH] IN BLOOD BY AUTOMATED COUNT: 15.3 % (ref 12.3–15.4)
GLOBULIN UR ELPH-MCNC: 3.9 GM/DL
GLUCOSE SERPL-MCNC: 89 MG/DL (ref 65–99)
HCT VFR BLD AUTO: 41.7 % (ref 34–46.6)
HDLC SERPL-MCNC: 60 MG/DL (ref 40–60)
HGB BLD-MCNC: 13.4 G/DL (ref 12–15.9)
IMM GRANULOCYTES # BLD AUTO: 0 10*3/MM3 (ref 0–0.05)
IMM GRANULOCYTES NFR BLD AUTO: 0 % (ref 0–0.5)
LDLC SERPL CALC-MCNC: 137 MG/DL (ref 0–100)
LDLC/HDLC SERPL: 2.24 {RATIO}
LYMPHOCYTES # BLD AUTO: 2.45 10*3/MM3 (ref 0.7–3.1)
LYMPHOCYTES NFR BLD AUTO: 38 % (ref 19.6–45.3)
MCH RBC QN AUTO: 29.5 PG (ref 26.6–33)
MCHC RBC AUTO-ENTMCNC: 32.1 G/DL (ref 31.5–35.7)
MCV RBC AUTO: 91.6 FL (ref 79–97)
MONOCYTES # BLD AUTO: 0.48 10*3/MM3 (ref 0.1–0.9)
MONOCYTES NFR BLD AUTO: 7.5 % (ref 5–12)
NEUTROPHILS NFR BLD AUTO: 3.33 10*3/MM3 (ref 1.7–7)
NEUTROPHILS NFR BLD AUTO: 51.7 % (ref 42.7–76)
PLATELET # BLD AUTO: 382 10*3/MM3 (ref 140–450)
PMV BLD AUTO: 9.7 FL (ref 6–12)
POTASSIUM SERPL-SCNC: 4.3 MMOL/L (ref 3.5–5.2)
PROT SERPL-MCNC: 7.7 G/DL (ref 6–8.5)
RBC # BLD AUTO: 4.55 10*6/MM3 (ref 3.77–5.28)
SODIUM SERPL-SCNC: 136 MMOL/L (ref 136–145)
TRIGL SERPL-MCNC: 97 MG/DL (ref 0–150)
TSH SERPL DL<=0.05 MIU/L-ACNC: 1.08 UIU/ML (ref 0.27–4.2)
VLDLC SERPL-MCNC: 17 MG/DL (ref 5–40)
WBC NRBC COR # BLD AUTO: 6.44 10*3/MM3 (ref 3.4–10.8)

## 2025-03-25 PROCEDURE — 82306 VITAMIN D 25 HYDROXY: CPT

## 2025-03-25 PROCEDURE — 80061 LIPID PANEL: CPT

## 2025-03-25 PROCEDURE — 85025 COMPLETE CBC W/AUTO DIFF WBC: CPT

## 2025-03-25 PROCEDURE — 36415 COLL VENOUS BLD VENIPUNCTURE: CPT

## 2025-03-25 PROCEDURE — 84443 ASSAY THYROID STIM HORMONE: CPT

## 2025-03-25 PROCEDURE — 80053 COMPREHEN METABOLIC PANEL: CPT

## 2025-03-25 PROCEDURE — 82533 TOTAL CORTISOL: CPT

## 2025-03-26 ENCOUNTER — RESULTS FOLLOW-UP (OUTPATIENT)
Dept: FAMILY MEDICINE CLINIC | Age: 65
End: 2025-03-26
Payer: MEDICARE

## 2025-03-26 DIAGNOSIS — R79.89 ELEVATED SERUM CREATININE: Primary | ICD-10-CM

## 2025-03-26 NOTE — PROGRESS NOTES
Vitamin D level is upper limits of normal.  Chart states you are currently taking vitamin D 5000 international units 2 tablets/day.  It would be okay to decrease your vitamin D supplement to 1000 international units/day if you would like.

## 2025-03-26 NOTE — LETTER
Martha S Sturgeon  [address] [place] [address]    April 9, 2025     Dear Ms. Sturgeon:    Below are the results from your recent visit:    Resulted Orders   DEXA Bone Density Axial    Impression    Impression:  Osteoporosis.          Report dictated by: Radha Song      I have personally reviewed this case and agree with the findings above:    Electronically Signed: Xander Schmidt MD    4/8/2025 11:03 AM EDT    Workstation ID: [id number]   Vitamin D 25 hydroxy   Result Value Ref Range    25 Hydroxy, Vitamin D 79.0 30.0 - 100.0 ng/ml   TSH Rfx On Abnormal To Free T4   Result Value Ref Range    TSH 1.080 0.270 - 4.200 uIU/mL   Cortisol - AM   Result Value Ref Range    Cortisol - AM 17.38 6.02 - 18.40 mcg/dL   Comprehensive metabolic panel   Result Value Ref Range    Glucose 89 65 - 99 mg/dL    BUN 12 8 - 23 mg/dL    Creatinine 1.12 (H) 0.57 - 1.00 mg/dL    Sodium 136 136 - 145 mmol/L    Potassium 4.3 3.5 - 5.2 mmol/L    Chloride 100 98 - 107 mmol/L    CO2 27.3 22.0 - 29.0 mmol/L    Calcium 9.9 8.6 - 10.5 mg/dL    Total Protein 7.7 6.0 - 8.5 g/dL    Albumin 3.8 3.5 - 5.2 g/dL    ALT (SGPT) 10 1 - 33 U/L    AST (SGOT) 17 1 - 32 U/L    Alkaline Phosphatase 94 39 - 117 U/L    Total Bilirubin 0.4 0.0 - 1.2 mg/dL    Globulin 3.9 gm/dL    A/G Ratio 1.0 g/dL    BUN/Creatinine Ratio 10.7 7.0 - 25.0    Anion Gap 8.7 5.0 - 15.0 mmol/L    eGFR 54.3 (L) >60.0 mL/min/1.73   Lipid panel   Result Value Ref Range    Total Cholesterol 214 (H) 0 - 200 mg/dL    Triglycerides 97 0 - 150 mg/dL    HDL Cholesterol 60 40 - 60 mg/dL    LDL Cholesterol  137 (H) 0 - 100 mg/dL    VLDL Cholesterol 17 5 - 40 mg/dL    LDL/HDL Ratio 2.24    CBC Auto Differential   Result Value Ref Range    WBC 6.44 3.40 - 10.80 10*3/mm3    RBC 4.55 3.77 - 5.28 10*6/mm3    Hemoglobin 13.4 12.0 - 15.9 g/dL    Hematocrit 41.7 34.0 - 46.6 %    MCV 91.6 79.0 - 97.0 fL    MCH 29.5 26.6 - 33.0 pg    MCHC 32.1 31.5 - 35.7 g/dL    RDW 15.3 12.3 - 15.4 %    RDW-SD  52.7 37.0 - 54.0 fl    MPV 9.7 6.0 - 12.0 fL    Platelets 382 140 - 450 10*3/mm3    Neutrophil % 51.7 42.7 - 76.0 %    Lymphocyte % 38.0 19.6 - 45.3 %    Monocyte % 7.5 5.0 - 12.0 %    Eosinophil % 2.3 0.3 - 6.2 %    Basophil % 0.5 0.0 - 1.5 %    Immature Grans % 0.0 0.0 - 0.5 %    Neutrophils, Absolute 3.33 1.70 - 7.00 10*3/mm3    Lymphocytes, Absolute 2.45 0.70 - 3.10 10*3/mm3    Monocytes, Absolute 0.48 0.10 - 0.90 10*3/mm3    Eosinophils, Absolute 0.15 0.00 - 0.40 10*3/mm3    Basophils, Absolute 0.03 0.00 - 0.20 10*3/mm3    Immature Grans, Absolute 0.00 0.00 - 0.05 10*3/mm3   Mammo Screening Digital Tomosynthesis Bilateral With CAD    Impression    No mammographic evidence of malignancy.  Recommend annual screening  mammography.     BI-RADS ASSESSMENT: Category 1: Negative     Note: It has been reported that there is approximately a 15% false  negative rate in mammography.  Therefore, management of a palpable  abnormality should not be deferred because of a negative mammogram.     4/8/2025 1:24 PM by [place] on Workstation: [id number]              Your recent mammogram is negative/normal. Please continue monthly self breast exams and report any changes.       If you have any questions or concerns, please don't hesitate to call.         Sincerely,        GINA Lazaro

## 2025-03-26 NOTE — PROGRESS NOTES
You are not anemic.  Blood sugar, thyroid and liver function are normal.  BUN, creatinine, and EGFR tell us about kidney function.  Your kidney function is slightly diminished.  This could be due to inadequate fluid intake.  I would first work on increasing your fluid intake and if kidney function does not improve in 1 month we may need to consider decreasing the dosage of your diuretic (hydrochlorothiazide).  Lipid panel remains with slightly elevated cholesterol.  Repeat kidney function in 1 month

## 2025-04-08 ENCOUNTER — HOSPITAL ENCOUNTER (OUTPATIENT)
Dept: BONE DENSITY | Facility: HOSPITAL | Age: 65
Discharge: HOME | End: 2025-04-08
Payer: MEDICARE

## 2025-04-08 ENCOUNTER — HOSPITAL ENCOUNTER (OUTPATIENT)
Dept: MAMMOGRAPHY | Facility: HOSPITAL | Age: 65
Discharge: HOME | End: 2025-04-08
Payer: MEDICARE

## 2025-04-08 ENCOUNTER — HOSPITAL ENCOUNTER (OUTPATIENT)
Dept: CT IMAGING | Facility: HOSPITAL | Age: 65
Discharge: HOME | End: 2025-04-08
Payer: MEDICARE

## 2025-04-08 DIAGNOSIS — Z12.31 BREAST CANCER SCREENING BY MAMMOGRAM: ICD-10-CM

## 2025-04-08 DIAGNOSIS — F17.210 CIGARETTE NICOTINE DEPENDENCE WITHOUT COMPLICATION: ICD-10-CM

## 2025-04-08 DIAGNOSIS — Z12.2 SCREENING FOR LUNG CANCER: ICD-10-CM

## 2025-04-08 PROCEDURE — 77063 BREAST TOMOSYNTHESIS BI: CPT

## 2025-04-08 PROCEDURE — 77067 SCR MAMMO BI INCL CAD: CPT

## 2025-04-08 PROCEDURE — 71271 CT THORAX LUNG CANCER SCR C-: CPT

## 2025-04-08 PROCEDURE — 77080 DXA BONE DENSITY AXIAL: CPT

## 2025-04-09 NOTE — PROGRESS NOTES
A decrease in bone density, osteoporosis, has been noted on your bone density testing.  In addition to weightbearing exercise and supplementing with calcium 1000 mg/day and vitamin D 1000 international units/day, you could be a candidate for prescription medicine to help build bone density.  Please follow-up to discuss options if you are interested in prescription management.  We most often use Fosamax that you take by mouth once a week or Prolia injection once every 6 months.  I would like to talk to the risk and benefits of any medication that we may consider starting.

## 2025-04-09 NOTE — PROGRESS NOTES
Your recent mammogram is negative/normal.  Please continue monthly self breast exams and report any changes.

## 2025-04-13 NOTE — PROGRESS NOTES
No signs of lung cancer.  You do have significant coronary artery calcification which makes it more important to not use nicotine and start a cholesterol lowering medication such as rosuvastatin 10 mg at  bedtime.  Please let me know if you want to start rosuvastatin.  Moderate emphysema is noted.  Report any shortness of breath or wheezing.  Non obstructing left kidney stone.  Report left mid back pain or urinary symptoms if present.  This stone may not cause any further concern.  Also decrease intake of sodium to no more than 2000 mg per day.  Bilateral adrenal are harmless/ non cancerous and do not require treatment.  Continuing annual lung cancer screening will help us continue to monitor all of these findings.

## 2025-05-15 ENCOUNTER — TELEPHONE (OUTPATIENT)
Dept: FAMILY MEDICINE CLINIC | Age: 65
End: 2025-05-15
Payer: MEDICARE

## 2025-05-28 ENCOUNTER — LAB (OUTPATIENT)
Dept: LAB | Facility: HOSPITAL | Age: 65
End: 2025-05-28
Payer: MEDICARE

## 2025-05-28 DIAGNOSIS — R79.89 ELEVATED SERUM CREATININE: ICD-10-CM

## 2025-05-28 LAB
ANION GAP SERPL CALCULATED.3IONS-SCNC: 11.6 MMOL/L (ref 5–15)
BUN SERPL-MCNC: 17 MG/DL (ref 8–23)
BUN/CREAT SERPL: 19.8 (ref 7–25)
CALCIUM SPEC-SCNC: 9.7 MG/DL (ref 8.6–10.5)
CHLORIDE SERPL-SCNC: 98 MMOL/L (ref 98–107)
CO2 SERPL-SCNC: 25.4 MMOL/L (ref 22–29)
CREAT SERPL-MCNC: 0.86 MG/DL (ref 0.57–1)
EGFRCR SERPLBLD CKD-EPI 2021: 74.6 ML/MIN/1.73
GLUCOSE SERPL-MCNC: 88 MG/DL (ref 65–99)
POTASSIUM SERPL-SCNC: 3.9 MMOL/L (ref 3.5–5.2)
SODIUM SERPL-SCNC: 135 MMOL/L (ref 136–145)

## 2025-05-28 PROCEDURE — 36415 COLL VENOUS BLD VENIPUNCTURE: CPT

## 2025-05-28 PROCEDURE — 80048 BASIC METABOLIC PNL TOTAL CA: CPT

## 2025-08-26 ENCOUNTER — LAB (OUTPATIENT)
Dept: LAB | Facility: HOSPITAL | Age: 65
End: 2025-08-26
Payer: MEDICARE

## 2025-08-26 ENCOUNTER — OFFICE VISIT (OUTPATIENT)
Dept: FAMILY MEDICINE CLINIC | Age: 65
End: 2025-08-26
Payer: MEDICARE

## 2025-08-26 VITALS
BODY MASS INDEX: 32.66 KG/M2 | TEMPERATURE: 98.1 F | HEART RATE: 87 BPM | OXYGEN SATURATION: 98 % | HEIGHT: 61 IN | SYSTOLIC BLOOD PRESSURE: 140 MMHG | WEIGHT: 173 LBS | DIASTOLIC BLOOD PRESSURE: 94 MMHG

## 2025-08-26 DIAGNOSIS — Z86.39 HISTORY OF HYPERTHYROIDISM: ICD-10-CM

## 2025-08-26 DIAGNOSIS — I10 ESSENTIAL HYPERTENSION: Primary | ICD-10-CM

## 2025-08-26 DIAGNOSIS — F41.9 ANXIETY: ICD-10-CM

## 2025-08-26 DIAGNOSIS — I10 ESSENTIAL HYPERTENSION: ICD-10-CM

## 2025-08-26 PROBLEM — E89.40 ASYMPTOMATIC POSTSURGICAL MENOPAUSE: Status: RESOLVED | Noted: 2025-02-25 | Resolved: 2025-08-26

## 2025-08-26 PROBLEM — Z11.59 SCREENING FOR VIRAL DISEASE: Status: RESOLVED | Noted: 2023-07-25 | Resolved: 2025-08-26

## 2025-08-26 PROBLEM — Z00.00 MEDICARE ANNUAL WELLNESS VISIT, INITIAL: Status: RESOLVED | Noted: 2025-02-25 | Resolved: 2025-08-26

## 2025-08-26 PROBLEM — H61.21 IMPACTED CERUMEN OF RIGHT EAR: Status: RESOLVED | Noted: 2025-02-25 | Resolved: 2025-08-26

## 2025-08-26 LAB
ALBUMIN SERPL-MCNC: 4.1 G/DL (ref 3.5–5.2)
ALBUMIN/GLOB SERPL: 1.1 G/DL
ALP SERPL-CCNC: 90 U/L (ref 39–117)
ALT SERPL W P-5'-P-CCNC: 9 U/L (ref 1–33)
ANION GAP SERPL CALCULATED.3IONS-SCNC: 14 MMOL/L (ref 5–15)
AST SERPL-CCNC: 14 U/L (ref 1–32)
BILIRUB SERPL-MCNC: 0.2 MG/DL (ref 0–1.2)
BUN SERPL-MCNC: 14 MG/DL (ref 8–23)
BUN/CREAT SERPL: 16.9 (ref 7–25)
CALCIUM SPEC-SCNC: 10.4 MG/DL (ref 8.6–10.5)
CHLORIDE SERPL-SCNC: 100 MMOL/L (ref 98–107)
CHOLEST SERPL-MCNC: 212 MG/DL (ref 0–200)
CO2 SERPL-SCNC: 25 MMOL/L (ref 22–29)
CREAT SERPL-MCNC: 0.83 MG/DL (ref 0.57–1)
EGFRCR SERPLBLD CKD-EPI 2021: 77.9 ML/MIN/1.73
GLOBULIN UR ELPH-MCNC: 3.8 GM/DL
GLUCOSE SERPL-MCNC: 76 MG/DL (ref 65–99)
HDLC SERPL-MCNC: 58 MG/DL (ref 40–60)
LDLC SERPL CALC-MCNC: 136 MG/DL (ref 0–100)
LDLC/HDLC SERPL: 2.31 {RATIO}
POTASSIUM SERPL-SCNC: 3.9 MMOL/L (ref 3.5–5.2)
PROT SERPL-MCNC: 7.9 G/DL (ref 6–8.5)
SODIUM SERPL-SCNC: 139 MMOL/L (ref 136–145)
TRIGL SERPL-MCNC: 99 MG/DL (ref 0–150)
TSH SERPL DL<=0.05 MIU/L-ACNC: 0.51 UIU/ML (ref 0.27–4.2)
VLDLC SERPL-MCNC: 18 MG/DL (ref 5–40)

## 2025-08-26 PROCEDURE — 80053 COMPREHEN METABOLIC PANEL: CPT

## 2025-08-26 PROCEDURE — 1126F AMNT PAIN NOTED NONE PRSNT: CPT | Performed by: NURSE PRACTITIONER

## 2025-08-26 PROCEDURE — 1160F RVW MEDS BY RX/DR IN RCRD: CPT | Performed by: NURSE PRACTITIONER

## 2025-08-26 PROCEDURE — 3080F DIAST BP >= 90 MM HG: CPT | Performed by: NURSE PRACTITIONER

## 2025-08-26 PROCEDURE — 99213 OFFICE O/P EST LOW 20 MIN: CPT | Performed by: NURSE PRACTITIONER

## 2025-08-26 PROCEDURE — 1159F MED LIST DOCD IN RCRD: CPT | Performed by: NURSE PRACTITIONER

## 2025-08-26 PROCEDURE — 3077F SYST BP >= 140 MM HG: CPT | Performed by: NURSE PRACTITIONER

## 2025-08-26 PROCEDURE — 36415 COLL VENOUS BLD VENIPUNCTURE: CPT

## 2025-08-26 PROCEDURE — 80061 LIPID PANEL: CPT

## 2025-08-26 PROCEDURE — 84443 ASSAY THYROID STIM HORMONE: CPT

## 2025-08-27 RX ORDER — PAROXETINE 20 MG/1
10 TABLET, FILM COATED ORAL DAILY
Qty: 45 TABLET | Refills: 1 | Status: SHIPPED | OUTPATIENT
Start: 2025-08-27

## 2025-08-27 RX ORDER — HYDROCHLOROTHIAZIDE 25 MG/1
25 TABLET ORAL DAILY
Qty: 90 TABLET | Refills: 1 | Status: SHIPPED | OUTPATIENT
Start: 2025-08-27